# Patient Record
Sex: MALE | Race: WHITE | NOT HISPANIC OR LATINO | Employment: FULL TIME | URBAN - NONMETROPOLITAN AREA
[De-identification: names, ages, dates, MRNs, and addresses within clinical notes are randomized per-mention and may not be internally consistent; named-entity substitution may affect disease eponyms.]

---

## 2022-05-09 PROCEDURE — U0004 COV-19 TEST NON-CDC HGH THRU: HCPCS | Performed by: NURSE PRACTITIONER

## 2022-05-16 PROCEDURE — U0004 COV-19 TEST NON-CDC HGH THRU: HCPCS | Performed by: NURSE PRACTITIONER

## 2022-09-23 ENCOUNTER — TRANSCRIBE ORDERS (OUTPATIENT)
Dept: LAB | Facility: HOSPITAL | Age: 57
End: 2022-09-23

## 2022-09-23 ENCOUNTER — HOSPITAL ENCOUNTER (OUTPATIENT)
Dept: GENERAL RADIOLOGY | Facility: HOSPITAL | Age: 57
Discharge: HOME | End: 2022-09-23

## 2022-09-23 DIAGNOSIS — Z02.1 PRE-EMPLOYMENT HEALTH SCREENING EXAMINATION: ICD-10-CM

## 2022-09-23 DIAGNOSIS — Z02.1 PRE-EMPLOYMENT HEALTH SCREENING EXAMINATION: Primary | ICD-10-CM

## 2022-09-23 PROCEDURE — 71046 X-RAY EXAM CHEST 2 VIEWS: CPT | Performed by: RADIOLOGY

## 2022-09-23 PROCEDURE — 71046 X-RAY EXAM CHEST 2 VIEWS: CPT

## 2025-01-02 ENCOUNTER — APPOINTMENT (OUTPATIENT)
Dept: MRI IMAGING | Facility: HOSPITAL | Age: 60
End: 2025-01-02
Payer: MEDICAID

## 2025-01-02 ENCOUNTER — APPOINTMENT (OUTPATIENT)
Dept: GENERAL RADIOLOGY | Facility: HOSPITAL | Age: 60
End: 2025-01-02
Payer: MEDICAID

## 2025-01-02 ENCOUNTER — HOSPITAL ENCOUNTER (EMERGENCY)
Facility: HOSPITAL | Age: 60
Discharge: HOME | End: 2025-01-02
Attending: EMERGENCY MEDICINE
Payer: MEDICAID

## 2025-01-02 ENCOUNTER — APPOINTMENT (OUTPATIENT)
Dept: CT IMAGING | Facility: HOSPITAL | Age: 60
End: 2025-01-02
Payer: MEDICAID

## 2025-01-02 VITALS
HEART RATE: 81 BPM | SYSTOLIC BLOOD PRESSURE: 135 MMHG | TEMPERATURE: 97.5 F | HEIGHT: 71 IN | WEIGHT: 201 LBS | DIASTOLIC BLOOD PRESSURE: 96 MMHG | BODY MASS INDEX: 28.14 KG/M2 | RESPIRATION RATE: 18 BRPM | OXYGEN SATURATION: 98 %

## 2025-01-02 DIAGNOSIS — R20.2 PARESTHESIAS: Primary | ICD-10-CM

## 2025-01-02 LAB
ALBUMIN SERPL-MCNC: 4.3 G/DL (ref 3.5–5.2)
ALBUMIN/GLOB SERPL: 1.3 G/DL
ALP SERPL-CCNC: 106 U/L (ref 39–117)
ALT SERPL W P-5'-P-CCNC: 15 U/L (ref 1–41)
ANION GAP SERPL CALCULATED.3IONS-SCNC: 9.8 MMOL/L (ref 5–15)
APTT PPP: 30.1 SECONDS (ref 23–35)
AST SERPL-CCNC: 20 U/L (ref 1–40)
BASOPHILS # BLD AUTO: 0.05 10*3/MM3 (ref 0–0.2)
BASOPHILS NFR BLD AUTO: 0.5 % (ref 0–1.5)
BILIRUB SERPL-MCNC: 0.5 MG/DL (ref 0–1.2)
BUN SERPL-MCNC: 8 MG/DL (ref 6–20)
BUN/CREAT SERPL: 10.3 (ref 7–25)
CALCIUM SPEC-SCNC: 9.5 MG/DL (ref 8.6–10.5)
CHLORIDE SERPL-SCNC: 105 MMOL/L (ref 98–107)
CO2 SERPL-SCNC: 26.2 MMOL/L (ref 22–29)
CREAT SERPL-MCNC: 0.78 MG/DL (ref 0.76–1.27)
DEPRECATED RDW RBC AUTO: 41.7 FL (ref 37–54)
EGFRCR SERPLBLD CKD-EPI 2021: 104 ML/MIN/1.73
EOSINOPHIL # BLD AUTO: 0.17 10*3/MM3 (ref 0–0.4)
EOSINOPHIL NFR BLD AUTO: 1.6 % (ref 0.3–6.2)
ERYTHROCYTE [DISTWIDTH] IN BLOOD BY AUTOMATED COUNT: 13.7 % (ref 12.3–15.4)
GLOBULIN UR ELPH-MCNC: 3.3 GM/DL
GLUCOSE SERPL-MCNC: 128 MG/DL (ref 65–99)
HCT VFR BLD AUTO: 48.7 % (ref 37.5–51)
HGB BLD-MCNC: 15.5 G/DL (ref 13–17.7)
HOLD SPECIMEN: NORMAL
HOLD SPECIMEN: NORMAL
IMM GRANULOCYTES # BLD AUTO: 0.04 10*3/MM3 (ref 0–0.05)
IMM GRANULOCYTES NFR BLD AUTO: 0.4 % (ref 0–0.5)
INR PPP: 1 (ref 0.9–1.1)
LYMPHOCYTES # BLD AUTO: 2.49 10*3/MM3 (ref 0.7–3.1)
LYMPHOCYTES NFR BLD AUTO: 23.7 % (ref 19.6–45.3)
MCH RBC QN AUTO: 26.6 PG (ref 26.6–33)
MCHC RBC AUTO-ENTMCNC: 31.8 G/DL (ref 31.5–35.7)
MCV RBC AUTO: 83.7 FL (ref 79–97)
MONOCYTES # BLD AUTO: 0.72 10*3/MM3 (ref 0.1–0.9)
MONOCYTES NFR BLD AUTO: 6.9 % (ref 5–12)
NEUTROPHILS NFR BLD AUTO: 66.9 % (ref 42.7–76)
NEUTROPHILS NFR BLD AUTO: 7.03 10*3/MM3 (ref 1.7–7)
NRBC BLD AUTO-RTO: 0 /100 WBC (ref 0–0.2)
PLATELET # BLD AUTO: 236 10*3/MM3 (ref 140–450)
PMV BLD AUTO: 10.8 FL (ref 6–12)
POTASSIUM SERPL-SCNC: 4.1 MMOL/L (ref 3.5–5.2)
PROT SERPL-MCNC: 7.6 G/DL (ref 6–8.5)
PROTHROMBIN TIME: 13.7 SECONDS (ref 12.3–15.1)
RBC # BLD AUTO: 5.82 10*6/MM3 (ref 4.14–5.8)
SODIUM SERPL-SCNC: 141 MMOL/L (ref 136–145)
WBC NRBC COR # BLD AUTO: 10.5 10*3/MM3 (ref 3.4–10.8)
WHOLE BLOOD HOLD COAG: NORMAL
WHOLE BLOOD HOLD SPECIMEN: NORMAL

## 2025-01-02 PROCEDURE — 99204 OFFICE O/P NEW MOD 45 MIN: CPT | Performed by: INTERNAL MEDICINE

## 2025-01-02 PROCEDURE — 70498 CT ANGIOGRAPHY NECK: CPT

## 2025-01-02 PROCEDURE — 85025 COMPLETE CBC W/AUTO DIFF WBC: CPT | Performed by: EMERGENCY MEDICINE

## 2025-01-02 PROCEDURE — 36415 COLL VENOUS BLD VENIPUNCTURE: CPT

## 2025-01-02 PROCEDURE — 93005 ELECTROCARDIOGRAM TRACING: CPT | Performed by: EMERGENCY MEDICINE

## 2025-01-02 PROCEDURE — 99285 EMERGENCY DEPT VISIT HI MDM: CPT | Performed by: EMERGENCY MEDICINE

## 2025-01-02 PROCEDURE — 70496 CT ANGIOGRAPHY HEAD: CPT

## 2025-01-02 PROCEDURE — 80053 COMPREHEN METABOLIC PANEL: CPT | Performed by: EMERGENCY MEDICINE

## 2025-01-02 PROCEDURE — 25510000001 IOPAMIDOL 61 % SOLUTION: Performed by: EMERGENCY MEDICINE

## 2025-01-02 PROCEDURE — 85730 THROMBOPLASTIN TIME PARTIAL: CPT | Performed by: EMERGENCY MEDICINE

## 2025-01-02 PROCEDURE — 70551 MRI BRAIN STEM W/O DYE: CPT

## 2025-01-02 PROCEDURE — 85610 PROTHROMBIN TIME: CPT | Performed by: EMERGENCY MEDICINE

## 2025-01-02 PROCEDURE — 70450 CT HEAD/BRAIN W/O DYE: CPT

## 2025-01-02 PROCEDURE — 0042T HC CT CEREBRAL PERFUSION W/WO CONTRAST: CPT

## 2025-01-02 PROCEDURE — 71045 X-RAY EXAM CHEST 1 VIEW: CPT

## 2025-01-02 RX ORDER — SODIUM CHLORIDE 0.9 % (FLUSH) 0.9 %
10 SYRINGE (ML) INJECTION AS NEEDED
Status: DISCONTINUED | OUTPATIENT
Start: 2025-01-02 | End: 2025-01-02

## 2025-01-02 RX ORDER — SODIUM CHLORIDE 0.9 % (FLUSH) 0.9 %
10 SYRINGE (ML) INJECTION AS NEEDED
Status: DISCONTINUED | OUTPATIENT
Start: 2025-01-02 | End: 2025-01-02 | Stop reason: HOSPADM

## 2025-01-02 RX ORDER — IOPAMIDOL 612 MG/ML
100 INJECTION, SOLUTION INTRAVASCULAR
Status: COMPLETED | OUTPATIENT
Start: 2025-01-02 | End: 2025-01-02

## 2025-01-02 RX ORDER — IOPAMIDOL 612 MG/ML
50 INJECTION, SOLUTION INTRAVASCULAR
Status: COMPLETED | OUTPATIENT
Start: 2025-01-02 | End: 2025-01-02

## 2025-01-02 RX ADMIN — IOPAMIDOL 75 ML: 612 INJECTION, SOLUTION INTRAVENOUS at 14:06

## 2025-01-02 RX ADMIN — IOPAMIDOL 40 ML: 612 INJECTION, SOLUTION INTRAVENOUS at 13:58

## 2025-01-02 NOTE — ED PROVIDER NOTES
Subjective   History of Present Illness  59-year-old male who presents for evaluation of paresthesias in his left arm that has now extended to his left face.  Patient reports that roughly 20 minutes prior to arrival he was at the store and he began to have tingling in his left hand.  It then progressed into his left face.  He continues to persist at this given time.  He denies any focal weakness.  He does appear nervous and anxious.  He does report having a TIA 4 years ago and was evaluated in [place].  Does not take any medications including no anticoagulants at this time.  He denies fever or infectious symptoms.  He reports feeling well prior to the onset of the symptoms.  No chest pain or abdominal pain.  No nausea or vomiting.  No change in bowel or urinary function.  No other acute complaints.  No recent change in overall habits.      Review of Systems   Constitutional:  Negative for chills, fatigue and fever.   HENT:  Negative for congestion, ear pain, postnasal drip, sinus pressure and sore throat.    Eyes:  Negative for pain, redness and visual disturbance.   Respiratory:  Negative for cough, chest tightness and shortness of breath.    Cardiovascular:  Negative for chest pain, palpitations and leg swelling.   Gastrointestinal:  Negative for abdominal pain, anal bleeding, blood in stool, diarrhea, nausea and vomiting.   Endocrine: Negative for polydipsia and polyuria.   Genitourinary:  Negative for difficulty urinating, dysuria, frequency and urgency.   Musculoskeletal:  Negative for arthralgias, back pain and neck pain.   Skin:  Negative for pallor and rash.   Allergic/Immunologic: Negative for environmental allergies and immunocompromised state.   Neurological:  Positive for numbness. Negative for dizziness, weakness and headaches.   Hematological:  Negative for adenopathy.   Psychiatric/Behavioral:  Negative for confusion, self-injury and suicidal ideas. The patient is not nervous/anxious.    All other  systems reviewed and are negative.      Past Medical History:   Diagnosis Date    Stroke     TIA (transient ischemic attack)        No Known Allergies    History reviewed. No pertinent surgical history.    History reviewed. No pertinent family history.    Social History     Socioeconomic History    Marital status:    Tobacco Use    Smoking status: Never    Smokeless tobacco: Never   Vaping Use    Vaping status: Never Used   Substance and Sexual Activity    Alcohol use: Never    Drug use: Defer    Sexual activity: Defer           Objective   Physical Exam  Vitals and nursing note reviewed.   Constitutional:       General: He is not in acute distress.     Appearance: Normal appearance. He is well-developed. He is not toxic-appearing or diaphoretic.   HENT:      Head: Normocephalic and atraumatic.      Right Ear: External ear normal.      Left Ear: External ear normal.      Nose: Nose normal.   Eyes:      General: Lids are normal.      Pupils: Pupils are equal, round, and reactive to light.   Neck:      Trachea: No tracheal deviation.   Cardiovascular:      Rate and Rhythm: Normal rate and regular rhythm.      Pulses: No decreased pulses.      Heart sounds: Normal heart sounds. No murmur heard.     No friction rub. No gallop.   Pulmonary:      Effort: Pulmonary effort is normal. No respiratory distress.      Breath sounds: Normal breath sounds. No decreased breath sounds, wheezing, rhonchi or rales.   Abdominal:      General: Bowel sounds are normal.      Palpations: Abdomen is soft.      Tenderness: There is no abdominal tenderness. There is no guarding or rebound.   Musculoskeletal:         General: No deformity. Normal range of motion.      Cervical back: Normal range of motion and neck supple.   Lymphadenopathy:      Cervical: No cervical adenopathy.   Skin:     General: Skin is warm and dry.      Findings: No rash.   Neurological:      Mental Status: He is alert and oriented to person, place, and time.       GCS: GCS eye subscore is 4. GCS verbal subscore is 5. GCS motor subscore is 6.      Cranial Nerves: No cranial nerve deficit.      Sensory: No sensory deficit.      Comments: Paresthesias reported in the left face and left arm.    Subtle left lower facial droop and left arm drift.  Left lower facial droop may be chronic and baseline as the nose is slightly deviated to the left.   Psychiatric:         Mood and Affect: Mood is anxious.         Speech: Speech normal.         Behavior: Behavior normal.         Thought Content: Thought content normal.         Judgment: Judgment normal.         Procedures           ED Course  ED Course as of 01/05/25 1013   Thu Jan 02, 2025   1415 EKG performed on 1/2/2025 at 1401 was independently inter by myself shows sinus rhythm, occasional PVCs, normal QRS, normal QTc, no acute ischemic changes. [NS]      ED Course User Index  [NS] Apolinar Ha MD                                                       Medical Decision Making  Differential diagnosis includes TIA, stroke, cervical radiculopathy, hyperventilation, anxiety attack, other unspecified etiology.    Labs are normal including normal kidney function electrolytes.  Normal white count.    Due to relatively sudden symptom onset the patient was ran as a code stroke and the stroke service was consulted.  It was recommended against tenecteplase or any kind of acute intervention.    CT scan of the head without contrast, CT perfusion, CT angio of the head and neck, MRI of the brain, and chest x-ray showed no acute abnormalities.    CT angiogram the neck does report mild plaque causing less than 50% stenosis of bilateral internal carotid arteries.  Asymmetric enlargement of the cord plexus in the right occipital horn on CT scan of the head papilloma is not secluded.    No significant intracranial arterial stenosis.    The patient's vital signs and overall appearance has remained normal throughout the ER course.    He will be  discharged with the advice to monitor symptoms and follow-up outpatient with neurology.    Problems Addressed:  Paresthesias: complicated acute illness or injury with systemic symptoms    Amount and/or Complexity of Data Reviewed  External Data Reviewed: labs, radiology and ECG.  Labs: ordered. Decision-making details documented in ED Course.  Radiology: ordered and independent interpretation performed. Decision-making details documented in ED Course.  ECG/medicine tests: ordered and independent interpretation performed. Decision-making details documented in ED Course.    Risk  Prescription drug management.        Final diagnoses:   Paresthesias       ED Disposition  ED Disposition       ED Disposition   Discharge    Condition   Stable    Comment   --               [facility] NEUROLOGY PROVIDER  [address] [place] [id number] [phone]  Schedule an appointment as soon as possible for a visit       PATIENT CONNECTION - [facility] [id number]  [phone]  Schedule an appointment as soon as possible for a visit            Medication List      No changes were made to your prescriptions during this visit.            Apolinar Ha MD  01/05/25 1016

## 2025-01-02 NOTE — CONSULTS
[facility]   Teleneurology Note    Patient Name: Kevin Thurman  : 1965  MRN: [id number]  Primary Care Physician: Provider, No Known  Referring Site: [place]  Location of Neurologist: [place]    Subjective   Teleneurology Initial Data           Neurologist Evaluation Date: 25 Neurologist Evaluation Time: 8   Date Last Known Well: 25       History     59 year old man with hx of migraines, varicose veins, DM , an episode of R sided numbness - TIA a few years back. Not compliant with secondary prevention. Coming with left sided numbness.     Stroke Risk Factors/ Pertinent Data     Stroke risk factors: prior stroke/ TIA  Anticoagulants prior to arrival: none  Antiplatelets prior to arrival: none  Statins prior to arrival: none     Scoring Scales     Modified [place] Scale  Modified [place] Scale: 0 - No Symptoms at all.    NIH Stroke Scale           Interval: baseline  1a. Level of Consciousness: 0-->Alert, keenly responsive  1b. LOC Questions: 0-->Answers both questions correctly  1c. LOC Commands: 0-->Performs both tasks correctly  2. Best Gaze: 0-->Normal  3. Visual: 0-->No visual loss  4. Facial Palsy: 0-->Normal symmetrical movements  5a. Motor Arm, Left: 0-->No drift, limb holds 90 (or 45) degrees for full 10 secs  5b. Motor Arm, Right: 0-->No drift, limb holds 90 (or 45) degrees for full 10 secs  6a. Motor Leg, Left: 0-->No drift, leg holds 30 degree position for full 5 secs  6b. Motor Leg, Right: 0-->No drift, leg holds 30 degree position for full 5 secs  7. Limb Ataxia: 0-->Absent  8. Sensory: 0-->Normal, no sensory loss  9. Best Language: 0-->No aphasia, normal  10. Dysarthria: 0-->Normal  11. Extinction and Inattention (formerly Neglect): 0-->No abnormality  Total (NIH Stroke Scale): 0     Review of Systems     Review of Systems    10+ systems were reviewed.  In addition to what is listed in the HPI, the following was positive:     Constitutional: No fevers or chills.  Psychiatric: No  depression/anxiety.  Skin: No rashes.  Respiratory: No shortness of breath.  Cardiovascular: No chest pain.  GI: No nausea/vomiting.  : No incontinence.  Endocrine: No polydipsia.  Musculoskeletal: No muscle pain/joint pain.  Neurologic: as per HPI and otherwise negative  Hematologic: No excessive bruising.      Objective   Exam     Exam performed with the help of support staff from the referring site  Neurological Exam      General: Alert, cooperative, no distress, appears stated age  Head: normocephalic, without obvious abnormalities, atraumatic  Eyes: conjunctivae/corneas clear  Throat: lips, mucosa, and tongue normal  Lungs: non labored breathing  Extremities: No edema, no cyanosis, no deformities  psych: judgement and insight is appropriate, see neuro exam for mental status.      Neurological Examination:    Mental status: fully alert; fully oriented; normal language fluency, comprehension. No dysarthria was appreciated; No evidence of visuospatial or tactile neglect;  Cranial nerves:  visual fields were full to confrontation; pupils were equal and reactive to light; versions were full without nystagmus; facial sensation was full; eye closure symmetric and smile was symmetric; handles own secretions, shoulder shrug was symmetric; tongue protruded midline.  Motor:  no pronator drift; power was grossly full throughout  Sensory: R sided numbness  Coordination:  no ataxia with finger to nose or heel to shin testing. No involuntary movements were observed.      Result Review    Results     CT head no acute IC changes  CTA head and neck was okay    Thrombolytic   Thrombolytics: thrombolytic not given  Thrombolytic Relative Exclusions for All Patients: Only minor non-disabling symptoms     Assessment & Plan   Assessment/ Plan     Assessment:    TIA vs complex migraine.    Plan:    Permissive HTN, treat if SBP is < 220 and if DBP > 110.  MRI brain wo contrast  ECHO, A1C  ASA 81 mg and clopidogrel 300 mg load and 75  mg maintenance.   Atorvastatin 80 mg  TSH, vit b12, folate, CBC, CMP  PT/OT  Follow up in stroke clinic.    Disposition         I, Lit Valle MD, saw the patient on 01/02/25 at 1418 for an initial in-patient or emergency room telememedicine face to face consult using interactive technology for stroke code. The location of the patient was [place]. I was located at [place].      Lit Valle MD

## 2025-01-02 NOTE — Clinical Note
[facility] EMERGENCY DEPARTMENT  [address] [address]  Phone: [phone]    Kevin Thurman was seen and treated in our emergency department on 1/2/2025.  He may return to work on 01/06/2025.         Thank you for choosing [facility].    Apolinar Ha MD

## 2025-01-22 ENCOUNTER — OFFICE VISIT (OUTPATIENT)
Dept: FAMILY MEDICINE CLINIC | Facility: CLINIC | Age: 60
End: 2025-01-22
Payer: COMMERCIAL

## 2025-01-22 VITALS
DIASTOLIC BLOOD PRESSURE: 84 MMHG | HEIGHT: 71 IN | SYSTOLIC BLOOD PRESSURE: 130 MMHG | RESPIRATION RATE: 16 BRPM | BODY MASS INDEX: 28.61 KG/M2 | WEIGHT: 204.4 LBS | OXYGEN SATURATION: 97 % | HEART RATE: 71 BPM

## 2025-01-22 DIAGNOSIS — I86.1 LEFT VARICOCELE: ICD-10-CM

## 2025-01-22 DIAGNOSIS — Z12.11 SCREENING FOR MALIGNANT NEOPLASM OF COLON: ICD-10-CM

## 2025-01-22 DIAGNOSIS — R07.9 CHEST PAIN, UNSPECIFIED TYPE: ICD-10-CM

## 2025-01-22 DIAGNOSIS — Z00.00 ANNUAL PHYSICAL EXAM: ICD-10-CM

## 2025-01-22 DIAGNOSIS — Z76.89 ENCOUNTER TO ESTABLISH CARE WITH NEW DOCTOR: Primary | ICD-10-CM

## 2025-01-22 DIAGNOSIS — R29.90 STROKE-LIKE SYMPTOMS: ICD-10-CM

## 2025-01-22 DIAGNOSIS — G43.009 MIGRAINE WITHOUT AURA AND WITHOUT STATUS MIGRAINOSUS, NOT INTRACTABLE: ICD-10-CM

## 2025-01-22 DIAGNOSIS — E11.9 TYPE 2 DIABETES MELLITUS WITHOUT COMPLICATION, WITHOUT LONG-TERM CURRENT USE OF INSULIN: ICD-10-CM

## 2025-01-22 LAB
EXPIRATION DATE: ABNORMAL
HBA1C MFR BLD: 6.9 % (ref 4.5–5.7)
Lab: ABNORMAL

## 2025-01-22 PROCEDURE — 83036 HEMOGLOBIN GLYCOSYLATED A1C: CPT | Performed by: STUDENT IN AN ORGANIZED HEALTH CARE EDUCATION/TRAINING PROGRAM

## 2025-01-22 PROCEDURE — 99204 OFFICE O/P NEW MOD 45 MIN: CPT | Performed by: STUDENT IN AN ORGANIZED HEALTH CARE EDUCATION/TRAINING PROGRAM

## 2025-01-22 RX ORDER — ASPIRIN 81 MG/1
81 TABLET ORAL DAILY
Qty: 90 TABLET | Refills: 0 | Status: SHIPPED | OUTPATIENT
Start: 2025-01-22

## 2025-01-22 NOTE — PROGRESS NOTES
New Patient Office Visit      Date: 2025  Patient Name: Kevin Thurman  : 1965   MRN: [id number]     Chief Complaint:    Chief Complaint   Patient presents with    Migraine     No history of, started 2024    Establish Care    Varicocele     Bulge on testicles, has had for about 4 years       History of Present Illness: Kevin Thurman is a 59 y.o. male who is here today to establish with family medicine.    Patient reports that he is here to establish with new family physician.    Patient reports that he was seen in hospital recently for stroke like symptoms though was confirmed to not be stroke and has been referred to neurology. Patient reports that symptoms were left arm numbness, left face numbness, and starting to be numb in left leg. Patient reports that these symptoms are similar to previous episode he had 4 years ago though did have some chest pain during this occurrence. Patient reports that symptoms have at present resolved.     Patient reports that he has developed migraines starting in 2024. Patient describes this as being triggered by bright light followed by sharp pain behind both eyes. Patient describes pain was present most significantly on head near temples and behind eyes. Patient does not recall if eyes were watering. Patient denies history of migraines prior to this. Patient reports that he was off work at the time and does not recall factors that were different from normal that would cause this. Patient reports that he has had 2 migraines in total and is very sensitive to light during these times. Patient reports that he has used Tylenol for this and this has helped.    Patient reports that he was diagnosed with Type II Diabetes at the same time as previous stroke like symptoms 4 years ago. Patient reports that he was on metformin previously though discontinued this because it caused GI symptoms and has since controlled this with diet and exercise.    Patient reports  "that he does have a varicocele on his left testicle for roughly 4 years. Patient reports that he has seen a urologist in [place] prior to moving here. Patient reports that they had considered surgery roughly 4 years ago though has not re-established yet to have this done. Patient would like to establish with urologist.      Subjective     Past Medical History:   Past Medical History:   Diagnosis Date    Stroke     TIA (transient ischemic attack)        Past Surgical History: History reviewed. No pertinent surgical history.    Family History: History reviewed. No pertinent family history.    Social History:   Social History     Socioeconomic History    Marital status:    Tobacco Use    Smoking status: Never    Smokeless tobacco: Never   Vaping Use    Vaping status: Never Used   Substance and Sexual Activity    Alcohol use: Never    Drug use: Defer    Sexual activity: Defer       Medications:     Current Outpatient Medications:     aspirin 81 MG EC tablet, Take 1 tablet by mouth Daily., Disp: 90 tablet, Rfl: 0    Allergies:   No Known Allergies    Objective     Physical Exam:  Vital Signs:   Vitals:    01/22/25 0918   BP: 130/84   Pulse: 71   Resp: 16   SpO2: 97%   Weight: 92.7 kg (204 lb 6.4 oz)   Height: 180.3 cm (71\")     Body mass index is 28.51 kg/m².   Facility age limit for growth %michelle is 20 years.       Physical Exam    General:  Well appearing adult male in no acute distress. Alert and oriented. Vitals reviewed.  Head/ENT: Atraumatic. No facial/sinus tenderness to palpation. Tympanic membranes are normal bilaterally with normal appearing auditory canals. Oral cavity unremarkable.  Neck: Anatomy appears symmetrical. There is no palpable lymphadenopathy to palpation.  Cardiac: Regular rate and rhythm to auscultation. I detect no obvious murmurs or additional heart sounds during exam.  Pulmonary: Lungs are clear to auscultation bilaterally.  Abdomen: Normal appearing abdomen. Normal bowel sounds to " auscultation. Non-tender to palpation of upper and lower abdominal quadrants bilaterally.  Pelvic/Genitourinary: Penile examination is unremarkable without evidence of lesions or abnormal discharge. Testicular examination shows findings consistent with left varicocele to palpation.  Extremities: Upper extremities demonstrate preserved range of motion. There appears to be no evidence of edema bilaterally. Lower extremities demonstrate preserved range of motion - both passive and active. There is no appreciated lower extremity edema bilaterally to palpation.  Integumentary/Skin: Skin appears unremarkable from observable skin surfaces.   Neurological: Cranial nerves appear grossly intact. Cerebellar function appears preserved. Motor function preserved in bilateral upper and lower extremities. Sensation preserved in face/head and bilateral upper and lower extremities. Normal gait and speech.  Behavioral/Psych: Patient behavior/demeanor appears consistent with reported age. Patient is pleasant with normal affect today.      Procedures      Assessment / Plan      1. Encounter to establish care with new doctor    2. Stroke-like symptoms    3. Chest pain, unspecified type  - Adult Transthoracic Echo Complete W/ Cont if Necessary Per Protocol; Future  - aspirin 81 MG EC tablet; Take 1 tablet by mouth Daily.  Dispense: 90 tablet; Refill: 0  - Ambulatory Referral to Cardiology    4. Migraine without aura and without status migrainosus, not intractable    5. Type 2 diabetes mellitus without complication, without long-term current use of insulin  - POC Glycosylated Hemoglobin (Hb A1C)  - Comprehensive metabolic panel  - Hemoglobin A1c  - Lipid panel    6. Left varicocele  - Ambulatory Referral to Urology    7. Annual physical exam  - Ambulatory Referral For Screening Colonoscopy  - CBC w AUTO Differential  - Comprehensive metabolic panel  - Hemoglobin A1c  - TSH Rfx On Abnormal To Free T4  - Lipid panel  - Hepatitis C  antibody    8. Screening for malignant neoplasm of colon  - Ambulatory Referral For Screening Colonoscopy     Assessment & Plan  1. Stroke-like symptoms.  He was recently evaluated in the emergency setting for stroke-like symptoms, including left arm numbness, left facial numbness, and the onset of numbness in his left leg. His symptoms have since resolved, and he has an upcoming appointment with neurology, although he has not yet been seen by them. A review of his imaging shows no acute stroke, and he has been encouraged to keep his neurology appointment.    2. Chest pain.  He experienced chest pain during the episode of left arm and facial numbness. An EKG performed in the emergency department showed no evidence of ischemia, but it appears that no troponin test was conducted. Given his diabetes, he is at an increased risk for cardiovascular disease. An order for an echocardiogram will be placed today. A referral to cardiology for evaluation of a potential stress test and, depending on the results, a potential heart catheterization has also been placed. The initiation of low-dose aspirin has been discussed, and he is agreeable to this. The potential need of statin therapy, given his diabetes, have also been discussed, and he is agreeable to this pending labs. A lipid profile will be obtained today. Patient to seek care in emergency setting for chest pain or shortness of breath.    3. Migraines.  He reports the onset of migraines beginning in 12/2024, describing 2 episodes of headaches with pain primarily in the temple areas and behind the eyes. These headaches are accompanied by light sensitivity and have responded well to Tylenol. It was discussed that medications such as NSAIDs or combination medicines like Excedrin Migraine would likely provide better relief. If he continues to experience frequent headaches that are not alleviated by Excedrin Migraine, prophylactic migraine medication and abortive medication may  need to be considered. He understands this plan.    4. Type 2 diabetes.  He has a diagnosis of type 2 diabetes, made approximately 4 years ago when he experienced similar stroke-like symptoms. His A1c was above 11, and he was subsequently placed on metformin. However, he discontinued metformin due to gastrointestinal side effects and has been managing his diabetes with diet and exercise. His point-of-care A1c today is 6.9. It was discussed that ideally, he would be on medication, and the possibility of starting an SGLT2 inhibitor such as Jardiance or Farxiga was discussed. He wishes to consider this option and prefers to control his diabetes with diet and exercise in the meantime. He will contemplate medication between now and his next visit. The potential need for statin medication has also been discussed. A diabetic foot exam is planned for next month. He is establishing care with an eye physician.    5. Varicocele.  He has a reported varicocele, and the physical exam findings are consistent with a left-sided varicocele. He has previously seen a urologist prior to moving to [place], and it appears that surgery was planned as the varicocele is somewhat tender. Outside urological records will be obtained, and a urologic referral will be placed today for reestablishment.    6. Health maintenance.  He declines the influenza vaccination today. He is agreeable to screening/surveillance labs, including CBC, CMP, TSH, lipid panel, A1c, and hepatitis C screening. He is also agreeable to colorectal screening with colonoscopy. He reports that he is a previous smoker but has not smoked in 20 years.    Follow-up  The patient will follow up in 1 month.      Patient or patient representative verbalized consent for the use of Ambient Listening during the visit with  Tong Sorenson MD for chart documentation. 1/22/2025  11:09 EST       Follow Up:   Return in about 1 month (around 2/22/2025).      Tong Sorenson  MD [facility] FLORES [facility]  [facility] FAMILY MEDICINE  [address] [place] [address] [address] [place] [address]  Fax [phone]  Phone [phone]

## 2025-01-23 ENCOUNTER — TELEPHONE (OUTPATIENT)
Dept: SURGERY | Facility: CLINIC | Age: 60
End: 2025-01-23
Payer: COMMERCIAL

## 2025-01-23 ENCOUNTER — PATIENT ROUNDING (BHMG ONLY) (OUTPATIENT)
Dept: FAMILY MEDICINE CLINIC | Facility: CLINIC | Age: 60
End: 2025-01-23
Payer: COMMERCIAL

## 2025-01-23 RX ORDER — POLYETHYLENE GLYCOL 3350 17 G/17G
POWDER, FOR SOLUTION ORAL
Qty: 238 G | Refills: 0 | Status: SHIPPED | OUTPATIENT
Start: 2025-01-23

## 2025-01-23 RX ORDER — BISACODYL 5 MG/1
TABLET, DELAYED RELEASE ORAL
Qty: 4 TABLET | Refills: 0 | Status: SHIPPED | OUTPATIENT
Start: 2025-01-23

## 2025-01-23 NOTE — TELEPHONE ENCOUNTER
Pt would like to scheduled at [facility] on 03/21 W/ Dr. Saeed-verified pharmacy/insurance. Thank you.

## 2025-01-23 NOTE — TELEPHONE ENCOUNTER
PRESCREENING FOR OPEN ACCESS SCHEDULING    Kevin Thurman, 1965  [id number]    01/23/25    If, the patient answers yes to any of the following questions the provider will be informed prior to scheduling open access for approval and documented in the chart.    []  Yes  [x] No    1. Have you ever had a colonoscopy in the past?      When:        Where:       Polyps or other:     []  Yes  [x] No    2. Family history of colon cancer?      Relation:       Age of onset:       Do you currently have any of the following?    []  Yes  [x] No  Rectal bleeding, if so, how long?     []  Yes  [x] No  Abdominal pain, if so, how long?    []  Yes  [x] No  Constipation, if so, how long?    []  Yes  [x] No  Diarrhea, if so, how long?    []  Yes  [x] No  Weight loss, is so, how much?    [] Yes  [x] No  Small caliber stool, if so, how long?    []Yes  [x] No  Do you have Hemorroids?    []Yes  [x] No  Have you been diagnosed with Anemia?    []Yes  [x] No  Do you have difficulty swallowing?  []Yes  [x] No  Do you have a history of esophageal stricture or dilation?(If yes do not schedule with Dr Griffith)    []Yes  [x] No  Do you have acid reflux?    Have you ever had any of the following conditions?    [] Yes  [x] No  Heart attack?      When?       Last cardiac workup?     Current Cardiologist?     Blood thinners? 81 Mg Aspirin     [] Yes  [x] No   Lung problems, asthma or COPD?  [] Yes  [x] No  Oxygen required?       [] Yes  [x] No  Stroke?     [] Yes  [x] No  Have you ever had a reaction to anesthesia?

## 2025-01-23 NOTE — TELEPHONE ENCOUNTER
Miralax prep sent to pharmacy.  Chart reviewed and ok to proceed as open access.  No past colon or family history

## 2025-01-24 ENCOUNTER — PREP FOR SURGERY (OUTPATIENT)
Dept: OTHER | Facility: HOSPITAL | Age: 60
End: 2025-01-24
Payer: COMMERCIAL

## 2025-01-24 DIAGNOSIS — I86.1 VARICOCELE: Primary | ICD-10-CM

## 2025-01-24 DIAGNOSIS — Z12.11 SCREENING FOR COLON CANCER: Primary | ICD-10-CM

## 2025-01-24 LAB
ALBUMIN SERPL-MCNC: 4 G/DL (ref 3.5–5.2)
ALBUMIN/GLOB SERPL: 1.5 G/DL
ALP SERPL-CCNC: 104 U/L (ref 39–117)
ALT SERPL-CCNC: 18 U/L (ref 1–41)
AST SERPL-CCNC: 14 U/L (ref 1–40)
BASOPHILS # BLD AUTO: 0.04 10*3/MM3 (ref 0–0.2)
BASOPHILS NFR BLD AUTO: 0.6 % (ref 0–1.5)
BILIRUB SERPL-MCNC: 0.5 MG/DL (ref 0–1.2)
BUN SERPL-MCNC: 9 MG/DL (ref 6–20)
BUN/CREAT SERPL: 11.7 (ref 7–25)
CALCIUM SERPL-MCNC: 9.7 MG/DL (ref 8.6–10.5)
CHLORIDE SERPL-SCNC: 105 MMOL/L (ref 98–107)
CHOLEST SERPL-MCNC: 154 MG/DL (ref 0–200)
CO2 SERPL-SCNC: 27.7 MMOL/L (ref 22–29)
CREAT SERPL-MCNC: 0.77 MG/DL (ref 0.76–1.27)
EGFRCR SERPLBLD CKD-EPI 2021: 104.4 ML/MIN/1.73
EOSINOPHIL # BLD AUTO: 0.22 10*3/MM3 (ref 0–0.4)
EOSINOPHIL NFR BLD AUTO: 3.5 % (ref 0.3–6.2)
ERYTHROCYTE [DISTWIDTH] IN BLOOD BY AUTOMATED COUNT: 12.4 % (ref 12.3–15.4)
GLOBULIN SER CALC-MCNC: 2.6 GM/DL
GLUCOSE SERPL-MCNC: 140 MG/DL (ref 65–99)
HBA1C MFR BLD: 6.9 % (ref 4.8–5.6)
HCT VFR BLD AUTO: 46.6 % (ref 37.5–51)
HCV IGG SERPL QL IA: NON REACTIVE
HDLC SERPL-MCNC: 47 MG/DL (ref 40–60)
HGB BLD-MCNC: 15 G/DL (ref 13–17.7)
IMM GRANULOCYTES # BLD AUTO: 0.01 10*3/MM3 (ref 0–0.05)
IMM GRANULOCYTES NFR BLD AUTO: 0.2 % (ref 0–0.5)
LDLC SERPL CALC-MCNC: 94 MG/DL (ref 0–100)
LYMPHOCYTES # BLD AUTO: 2.17 10*3/MM3 (ref 0.7–3.1)
LYMPHOCYTES NFR BLD AUTO: 34.8 % (ref 19.6–45.3)
MCH RBC QN AUTO: 26.6 PG (ref 26.6–33)
MCHC RBC AUTO-ENTMCNC: 32.2 G/DL (ref 31.5–35.7)
MCV RBC AUTO: 82.6 FL (ref 79–97)
MONOCYTES # BLD AUTO: 0.56 10*3/MM3 (ref 0.1–0.9)
MONOCYTES NFR BLD AUTO: 9 % (ref 5–12)
NEUTROPHILS # BLD AUTO: 3.24 10*3/MM3 (ref 1.7–7)
NEUTROPHILS NFR BLD AUTO: 51.9 % (ref 42.7–76)
NRBC BLD AUTO-RTO: 0 /100 WBC (ref 0–0.2)
PLATELET # BLD AUTO: 239 10*3/MM3 (ref 140–450)
POTASSIUM SERPL-SCNC: 4.5 MMOL/L (ref 3.5–5.2)
PROT SERPL-MCNC: 6.6 G/DL (ref 6–8.5)
RBC # BLD AUTO: 5.64 10*6/MM3 (ref 4.14–5.8)
SODIUM SERPL-SCNC: 139 MMOL/L (ref 136–145)
T4 FREE SERPL-MCNC: 2.37 NG/DL (ref 0.93–1.7)
TRIGL SERPL-MCNC: 65 MG/DL (ref 0–150)
TSH SERPL DL<=0.005 MIU/L-ACNC: <0.005 UIU/ML (ref 0.27–4.2)
VLDLC SERPL CALC-MCNC: 13 MG/DL (ref 5–40)
WBC # BLD AUTO: 6.24 10*3/MM3 (ref 3.4–10.8)

## 2025-01-28 ENCOUNTER — TELEPHONE (OUTPATIENT)
Dept: FAMILY MEDICINE CLINIC | Facility: CLINIC | Age: 60
End: 2025-01-28
Payer: COMMERCIAL

## 2025-01-28 NOTE — TELEPHONE ENCOUNTER
----- Message from Tong Sorenson sent at 1/28/2025  9:30 AM EST -----  Please call patient.    His labs are suggestive of an overactive thyroid. I would like to obtain additional labs, thyroid US, and place referral to endocrinology if patient is agreeable to further assess this.    His A1c is the same as it was in office at 6.9. We can talk about this again at his follow up with me.    His other labs are normal.

## 2025-01-28 NOTE — TELEPHONE ENCOUNTER
Patient notified of results, he is agreeable to the additional labs, US, and Endo referral. He did ask if we could get the thyroid US scheduled at the same time as his US Scrotum. Advised patient to get labs drawn at this convenience and he will be contacted for the appts

## 2025-01-29 DIAGNOSIS — R79.89 ABNORMAL THYROID BLOOD TEST: Primary | ICD-10-CM

## 2025-01-31 ENCOUNTER — HOSPITAL ENCOUNTER (OUTPATIENT)
Dept: CARDIOLOGY | Facility: HOSPITAL | Age: 60
Discharge: HOME | End: 2025-01-31
Admitting: STUDENT IN AN ORGANIZED HEALTH CARE EDUCATION/TRAINING PROGRAM
Payer: COMMERCIAL

## 2025-01-31 VITALS
HEIGHT: 71 IN | DIASTOLIC BLOOD PRESSURE: 75 MMHG | SYSTOLIC BLOOD PRESSURE: 129 MMHG | WEIGHT: 204.37 LBS | BODY MASS INDEX: 28.61 KG/M2

## 2025-01-31 DIAGNOSIS — R07.9 CHEST PAIN, UNSPECIFIED TYPE: ICD-10-CM

## 2025-01-31 PROCEDURE — 93306 TTE W/DOPPLER COMPLETE: CPT

## 2025-02-01 LAB — LV EF BIPLANE MOD: 58 %

## 2025-02-05 ENCOUNTER — HOSPITAL ENCOUNTER (OUTPATIENT)
Facility: HOSPITAL | Age: 60
Discharge: HOME | End: 2025-02-05
Admitting: STUDENT IN AN ORGANIZED HEALTH CARE EDUCATION/TRAINING PROGRAM
Payer: COMMERCIAL

## 2025-02-05 DIAGNOSIS — I86.1 VARICOCELE: ICD-10-CM

## 2025-02-05 PROBLEM — E11.9 TYPE 2 DIABETES MELLITUS WITHOUT COMPLICATION: Status: ACTIVE | Noted: 2025-02-05

## 2025-02-05 PROBLEM — R07.89 CHEST PAIN, ATYPICAL: Status: ACTIVE | Noted: 2025-02-05

## 2025-02-05 PROCEDURE — 76870 US EXAM SCROTUM: CPT

## 2025-02-05 NOTE — ASSESSMENT & PLAN NOTE
Typical and atypical features with noted risk factors and family history  -Echocardiogram already obtained noting normal EF and grade I impaired relaxation  -Will obtain treadmill stress test to rule out ischemia  -Go to the ER with any new, changing, recurring, or worsening symptoms  -Follow up in 4 weeks to discuss test results with MD  Orders:    Treadmill Stress Test; Future

## 2025-02-05 NOTE — PROGRESS NOTES
[facility]    Office Consult     Kevin Thurman  [id number]  2025    Referred By: Tong Sorenson*    Chief Complaint   Patient presents with    Advice Only    Chest Pain       Subjective     History of Present Illness:   Kevin Thurman is a 59 y.o. male who presents to the Cardiology Clinic for evaluation of chest pain referred by PCP for further investigation.  He recently established care with PCP after an emergency department visit which he noted stroke-like symptoms, including left arm numbness, left facial numbness, and the onset of numbness in his left leg. His symptoms resolved and acute CVA was ruled out.  He did have noted chest pain with this episode.  Past health history of migraines and uncontrolled type 2 diabetes previously maintained on Metformin.  Stopped taking Metformin with GI side effects and has been managing with diet and exercise.  His A1c in the office was 6.9%.  His PCP started ASA therapy and placed referral for cardiology.      Since being discharged from the ER he has not had any further episodes of chest pain.  Describes left sided chest pressure radiated between shoulders.  No associated symptoms.  Episode lasted around 1 hr.  History of remote smoking.  Describes similar incident in the past about 4 years.  States he was driving and at the bank when the episode occurred.  Denies any increased stress.  Family history of heart disease.  Does not check blood pressure at home.  No cuurent complaints, denies any lower extremity edema or palpitations.      Past Cardiac Testin. Last Coronary Angio: None  2. Prior Stress Testing: None  3. Last Echo: 2025--EF-55%, Left ventricular diastolic function is consistent with (grade I) impaired relaxation   4. Prior Holter Monitor: None    Review of Systems   Constitutional:  Negative for activity change and fatigue.   Respiratory:  Negative for chest tightness and shortness of breath.   "  Cardiovascular:  Negative for chest pain, palpitations and leg swelling.   Neurological:  Negative for dizziness.   All other systems reviewed and are negative.       Past Medical History:   Diagnosis Date    Diabetes mellitus     Stroke     TIA (transient ischemic attack)        Past Surgical History:   Procedure Laterality Date    HERNIA REPAIR      TYMPANOPLASTY         Family History   Problem Relation Age of Onset    Heart disease Father         Sudhir adding names       Social History     Socioeconomic History    Marital status:    Tobacco Use    Smoking status: Former     Current packs/day: 0.00     Average packs/day: 0.5 packs/day for 3.0 years (1.5 ttl pk-yrs)     Types: Cigarettes     Start date: 1985     Quit date: 1988     Years since quittin.1    Smokeless tobacco: Never   Vaping Use    Vaping status: Never Used   Substance and Sexual Activity    Alcohol use: Never    Drug use: Never    Sexual activity: Yes     Partners: Female     Birth control/protection: Abstinence         Current Outpatient Medications:     aspirin 81 MG EC tablet, Take 1 tablet by mouth Daily., Disp: 90 tablet, Rfl: 0    bisacodyl (Dulcolax) 5 MG EC tablet, 4 TABLETS TO BE TAKING AT TIME DIRECTED ON INSTRUCTED THE DAY PRIOR TO COLONOSCOPY. (Patient not taking: Reported on 2025), Disp: 4 tablet, Rfl: 0    polyethylene glycol (MiraLax) 17 GM/SCOOP powder,  GRAM POWDER WITH 64 OZ OF CLEAR LIQUID. USE AS DIRECTED (Patient not taking: Reported on 2025), Disp: 238 g, Rfl: 0    No Known Allergies    Objective     Vitals:    25 0850   BP: 142/88   BP Location: Left arm   Patient Position: Sitting   Cuff Size: Adult   Pulse: 76   SpO2: 95%   Weight: 90.7 kg (200 lb)   Height: 177.8 cm (70\")     Body mass index is 28.7 kg/m².    Physical Exam  Vitals and nursing note reviewed.   Constitutional:       General: He is not in acute distress.     Appearance: Normal appearance. He is not ill-appearing or " toxic-appearing.   HENT:      Head: Normocephalic.      Mouth/Throat:      Mouth: Mucous membranes are moist.   Eyes:      Pupils: Pupils are equal, round, and reactive to light.   Cardiovascular:      Rate and Rhythm: Normal rate and regular rhythm.      Pulses: Normal pulses.      Heart sounds: Normal heart sounds. No murmur heard.  Pulmonary:      Effort: Pulmonary effort is normal.      Breath sounds: Normal breath sounds. No wheezing, rhonchi or rales.   Musculoskeletal:      Right lower leg: No edema.      Left lower leg: No edema.   Skin:     General: Skin is warm and dry.      Capillary Refill: Capillary refill takes less than 2 seconds.   Neurological:      Mental Status: He is alert and oriented to person, place, and time. Mental status is at baseline.   Psychiatric:         Mood and Affect: Mood normal.         Behavior: Behavior normal.         Thought Content: Thought content normal.         Results Review:   I reviewed the patient's new clinical results.    Procedures    Assessment & Plan  Chest pain, atypical  Typical and atypical features with noted risk factors and family history  -Echocardiogram already obtained noting normal EF and grade I impaired relaxation  -Will obtain treadmill stress test to rule out ischemia  -Go to the ER with any new, changing, recurring, or worsening symptoms  -Follow up in 4 weeks to discuss test results with MD  Orders:    Treadmill Stress Test; Future    Type 2 diabetes mellitus without complication, without long-term current use of insulin  -A1c-6.9% 1/2025  -Currently diet controlled  -Managed by PCP         Elevated blood pressure reading  -Mildly elevated BP today  -Does not regularly check  -Goal <130/80  -Will have patient keep log at home with blood pressure cuff sent in to pharmacy  -Avoid NSAIDs and limit salt in diet  Orders:    Blood Pressure Device      Preventative Cardiology:   Tobacco Cessation: N/A   Advance Care Planning: ACP discussion was held with  the patient during this visit. Patient does not have an advance directive, declines further assistance.     Follow Up:   Return in about 4 weeks (around 3/7/2025) for Next scheduled follow up--MD for testing results.      Thank you for allowing me to participate in the care of your patient. Please to not hesitate to contact me with additional questions or concerns.     Elaina Buck, APRN

## 2025-02-07 ENCOUNTER — PATIENT ROUNDING (BHMG ONLY) (OUTPATIENT)
Dept: CARDIOLOGY | Facility: CLINIC | Age: 60
End: 2025-02-07
Payer: COMMERCIAL

## 2025-02-07 ENCOUNTER — OFFICE VISIT (OUTPATIENT)
Dept: CARDIOLOGY | Facility: CLINIC | Age: 60
End: 2025-02-07
Payer: COMMERCIAL

## 2025-02-07 VITALS
HEART RATE: 76 BPM | DIASTOLIC BLOOD PRESSURE: 88 MMHG | WEIGHT: 200 LBS | BODY MASS INDEX: 28.63 KG/M2 | OXYGEN SATURATION: 95 % | SYSTOLIC BLOOD PRESSURE: 142 MMHG | HEIGHT: 70 IN

## 2025-02-07 DIAGNOSIS — R07.89 CHEST PAIN, ATYPICAL: ICD-10-CM

## 2025-02-07 DIAGNOSIS — R03.0 ELEVATED BLOOD PRESSURE READING: ICD-10-CM

## 2025-02-07 DIAGNOSIS — E11.9 TYPE 2 DIABETES MELLITUS WITHOUT COMPLICATION, WITHOUT LONG-TERM CURRENT USE OF INSULIN: Primary | ICD-10-CM

## 2025-02-07 NOTE — PROGRESS NOTES
[facility] welcome email and rounding message has been sent to patient via [employer].

## 2025-02-11 ENCOUNTER — OFFICE VISIT (OUTPATIENT)
Age: 60
End: 2025-02-11
Payer: COMMERCIAL

## 2025-02-11 VITALS
OXYGEN SATURATION: 98 % | BODY MASS INDEX: 28.2 KG/M2 | HEIGHT: 70 IN | SYSTOLIC BLOOD PRESSURE: 128 MMHG | DIASTOLIC BLOOD PRESSURE: 74 MMHG | WEIGHT: 197 LBS | HEART RATE: 76 BPM

## 2025-02-11 DIAGNOSIS — E05.90 HYPERTHYROIDISM: Primary | ICD-10-CM

## 2025-02-11 PROCEDURE — 99204 OFFICE O/P NEW MOD 45 MIN: CPT | Performed by: PHYSICIAN ASSISTANT

## 2025-02-11 NOTE — ASSESSMENT & PLAN NOTE
Discussed thyroid pituitary axis, explained lab results in detail  Patient asked to go ahead and schedule neck ultrasound and get follow-up labs after 4/1/2025  Patient advised to call our office if he starts having increased anxiety, palpitations/tachycardia, or excessive diaphoresis or change in stool frequency  May update treatment after lab results/neck ultrasound reviewed 4/2025  Differential diagnoses explained and include: Thyroiditis, toxic nodule, Graves' disease  Patient is clinically euthyroid on exam and does not wish to be started on any medication unless it is necessary, did discuss risks of prolonged hyperthyroidism including A-fib, stroke, decreased bone density and subsequent increased risk of fracture

## 2025-02-11 NOTE — PROGRESS NOTES
"     Office Note      Date: 2025  Patient Name: Kevin Thurman  MRN: [id number]  : 1965    Chief Complaint   Patient presents with    Abnormal Lab     Abnormal thyroid blood test       History of Present Illness:   Kevin Thurman is a 59 y.o. male who presents for Abnormal Lab (Abnormal thyroid blood test)  . Symptoms are: occasional palpitations, no increased in stool, no anxiety, no tremor. Symptoms began in: years ago. They are fluctuating a bit. Lab work showed overactive function. Patient recently had ed visit for left sided weakness and f/u cardiology for chest pain. He has stress test scheduled this week.  Has not had thyroid ultrasound ordered by pcp.    Subjective      Patient was born where: [place].  Facial radiation exposure: No.  High iodine intake: No  Family hx of thyroid disease: No.    Review of Systems:   Review of Systems   Constitutional:  Negative for diaphoresis.   HENT:  Positive for ear pain.    Respiratory:  Positive for shortness of breath.    Cardiovascular:  Positive for palpitations.   Gastrointestinal:  Negative for diarrhea, nausea and vomiting.   Endocrine: Negative for heat intolerance.   Neurological:  Negative for tremors.       The following portions of the patient's history were reviewed and updated as appropriate: allergies, current medications, past family history, past medical history, past social history, past surgical history, and problem list.    Objective     Visit Vitals  /74 (BP Location: Left arm, Patient Position: Sitting, Cuff Size: Adult)   Pulse 76   Ht 177.8 cm (70\")   Wt 89.4 kg (197 lb)   SpO2 98%   BMI 28.27 kg/m²       Physical Exam:  Physical Exam  Vitals reviewed.   Constitutional:       General: He is not in acute distress.     Appearance: Normal appearance. He is normal weight.   Neck:      Thyroid: No thyroid mass, thyromegaly or thyroid tenderness.   Cardiovascular:      Rate and Rhythm: Normal rate and regular rhythm.      Heart " sounds: Normal heart sounds.   Pulmonary:      Effort: Pulmonary effort is normal.      Breath sounds: Normal breath sounds.   Musculoskeletal:      Cervical back: Neck supple.   Lymphadenopathy:      Cervical: No cervical adenopathy.   Skin:     General: Skin is warm and dry.   Neurological:      General: No focal deficit present.      Mental Status: He is alert and oriented to person, place, and time. Mental status is at baseline.   Psychiatric:         Mood and Affect: Mood normal.         Behavior: Behavior normal.         Thought Content: Thought content normal.         Judgment: Judgment normal.       NO exophthalmos or tremor    Labs:    TSH  TSH Results:  Lab Results   Component Value Date    TSH <0.005 (L) 01/23/2025        Free T4  Free T4   Date Value Ref Range Status   01/23/2025 2.37 (H) 0.93 - 1.70 ng/dL Final     Comment:     Results may be falsely increased if patient taking Biotin.     Free T3  5.8, elevated    Thyrotropin receptor antibody  5.44, elevated    CBC w/DIFF  Lab Results   Component Value Date    WBC 6.24 01/23/2025    RBC 5.64 01/23/2025    HGB 15.0 01/23/2025    HCT 46.6 01/23/2025    MCV 82.6 01/23/2025    MCH 26.6 01/23/2025    MCHC 32.2 01/23/2025    RDW 12.4 01/23/2025    RDWSD 41.7 01/02/2025    MPV 10.8 01/02/2025     01/23/2025    NEUTRORELPCT 51.9 01/23/2025    LYMPHORELPCT 34.8 01/23/2025    MONORELPCT 9.0 01/23/2025    EOSRELPCT 3.5 01/23/2025    BASORELPCT 0.6 01/23/2025    AUTOIGPER 0.4 01/02/2025    NEUTROABS 3.24 01/23/2025    LYMPHSABS 2.17 01/23/2025    MONOSABS 0.56 01/23/2025    EOSABS 0.22 01/23/2025    BASOSABS 0.04 01/23/2025    AUTOIGNUM 0.04 01/02/2025    NRBC 0.0 01/23/2025           Assessment / Plan      Assessment & Plan:  Diagnoses and all orders for this visit:    1. Hyperthyroidism (Primary)  Assessment & Plan:  Discussed thyroid pituitary axis, explained lab results in detail  Patient asked to go ahead and schedule neck ultrasound and get follow-up  labs after 4/1/2025  Patient advised to call our office if he starts having increased anxiety, palpitations/tachycardia, or excessive diaphoresis or change in stool frequency  May update treatment after lab results/neck ultrasound reviewed 4/2025  Differential diagnoses explained and include: Thyroiditis, toxic nodule, Graves' disease  Patient is clinically euthyroid on exam and does not wish to be started on any medication unless it is necessary, did discuss risks of prolonged hyperthyroidism including A-fib, stroke, decreased bone density and subsequent increased risk of fracture    Orders:  -     TSH; Future  -     T4, Free; Future  -     T3, Free; Future  -     Thyroid Stimulating Immunoglobulin; Future       Current Outpatient Medications   Medication Instructions    aspirin 81 mg, Oral, Daily    bisacodyl (Dulcolax) 5 MG EC tablet 4 TABLETS TO BE TAKING AT TIME DIRECTED ON INSTRUCTED THE DAY PRIOR TO COLONOSCOPY.    polyethylene glycol (MiraLax) 17 GM/SCOOP powder  GRAM POWDER WITH 64 OZ OF CLEAR LIQUID. USE AS DIRECTED      F/u 3 months    Electronically signed by: Matt Cuevas PA-C  02/11/2025

## 2025-02-12 ENCOUNTER — PATIENT ROUNDING (BHMG ONLY) (OUTPATIENT)
Dept: ENDOCRINOLOGY | Facility: CLINIC | Age: 60
End: 2025-02-12
Payer: COMMERCIAL

## 2025-02-12 ENCOUNTER — OFFICE VISIT (OUTPATIENT)
Dept: UROLOGY | Facility: CLINIC | Age: 60
End: 2025-02-12
Payer: COMMERCIAL

## 2025-02-12 VITALS
OXYGEN SATURATION: 97 % | WEIGHT: 200.8 LBS | SYSTOLIC BLOOD PRESSURE: 130 MMHG | TEMPERATURE: 97.7 F | BODY MASS INDEX: 28.75 KG/M2 | HEART RATE: 84 BPM | DIASTOLIC BLOOD PRESSURE: 70 MMHG | HEIGHT: 70 IN | RESPIRATION RATE: 14 BRPM

## 2025-02-12 DIAGNOSIS — I86.1 BILATERAL VARICOCELES: Primary | ICD-10-CM

## 2025-02-12 DIAGNOSIS — N50.812 PAIN IN BOTH TESTICLES: ICD-10-CM

## 2025-02-12 DIAGNOSIS — N50.811 PAIN IN BOTH TESTICLES: ICD-10-CM

## 2025-02-12 PROBLEM — R07.89 CHEST PAIN, ATYPICAL: Status: RESOLVED | Noted: 2025-02-05 | Resolved: 2025-02-12

## 2025-02-12 PROBLEM — G45.9 TIA (TRANSIENT ISCHEMIC ATTACK): Status: ACTIVE | Noted: 2018-11-06

## 2025-02-12 PROBLEM — F32.A DEPRESSION: Status: ACTIVE | Noted: 2021-04-08

## 2025-02-12 NOTE — PROGRESS NOTES
A [employer] message has been sent to the patient for [place] [facility] [place]

## 2025-02-12 NOTE — PROGRESS NOTES
Office Visit Testicular Pain      Patient Name: Kevin Thurman  : 1965   MRN: [id number]     Chief Complaint: testicular pain.   Chief Complaint   Patient presents with    Varicocele     Scrotum mass is on left and it goes away sometimes and he can feel in groin area.     Establish Care     Referring Provider: Tong Sorenson*    History of Present Illness: Mr. Kevin Thurman is a 59 y.o. male with history of chronic left testicular pain x 4 years. Scheduled for colonoscopy.      Quality:    Sharp and dull   Provocative factors:  Activity makes it worse  Palliative factors:   Rest makes it better  Radiation:   Along scrotum   Severity:   5/10 currently and 10/10 at its worst  Previous treatments: none    yes Current LUTS: difficulty urinating over the past week. Denies UTI symptoms, fever chills, sweats.  Drinks mostly water and some coffee. Resolved at this time.   no History of vasectomy  no History of kidney stones, recent trauma or injury.  no History of constipation    Subjective      Review of System: ROS reviewed by me and is negative unless otherwise noted in HPI.      Past Medical History:   Past Medical History:   Diagnosis Date    Diabetes mellitus     Stroke     TIA (transient ischemic attack)        Past Surgical History:   Past Surgical History:   Procedure Laterality Date    HERNIA REPAIR      TYMPANOPLASTY         Family History:   Family History   Problem Relation Age of Onset    Heart disease Father         Naot adding names    Cancer Sister         My sister name not needed    Prostate cancer Neg Hx     Kidney cancer Neg Hx         no bladder cancer    Testicular cancer Neg Hx        Social History:   Social History     Socioeconomic History    Marital status:    Tobacco Use    Smoking status: Former     Current packs/day: 0.00     Average packs/day: 0.5 packs/day for 3.0 years (1.5 ttl pk-yrs)     Types: Cigarettes     Start date: 1985     Quit date: 1988  "    Years since quittin.1     Passive exposure: Never    Smokeless tobacco: Never   Vaping Use    Vaping status: Never Used   Substance and Sexual Activity    Alcohol use: Never    Drug use: Never    Sexual activity: Yes     Partners: Female     Birth control/protection: Abstinence, Coitus interruptus       Medications:     Current Outpatient Medications:     aspirin 81 MG EC tablet, Take 1 tablet by mouth Daily., Disp: 90 tablet, Rfl: 0    bisacodyl (Dulcolax) 5 MG EC tablet, 4 TABLETS TO BE TAKING AT TIME DIRECTED ON INSTRUCTED THE DAY PRIOR TO COLONOSCOPY. (Patient not taking: Reported on 2025), Disp: 4 tablet, Rfl: 0    polyethylene glycol (MiraLax) 17 GM/SCOOP powder,  GRAM POWDER WITH 64 OZ OF CLEAR LIQUID. USE AS DIRECTED (Patient not taking: Reported on 2025), Disp: 238 g, Rfl: 0    Allergies:   No Known Allergies    Objective     Physical Exam:   Vital Signs:   Vitals:    25 1450   BP: 130/70   BP Location: Left arm   Patient Position: Sitting   Cuff Size: Adult   Pulse: 84   Resp: 14   Temp: 97.7 °F (36.5 °C)   TempSrc: Temporal   SpO2: 97%   Weight: 91.1 kg (200 lb 12.8 oz)   Height: 178 cm (70.08\")   PainSc:   5   PainLoc: Scrotum  Comment: left scrotum and left groin     Body mass index is 28.75 kg/m².   Physical Exam  Vitals and nursing note reviewed. Exam conducted with a chaperone present.   Constitutional:       General: He is not in acute distress.     Appearance: Normal appearance. He is not ill-appearing.   HENT:      Head: Normocephalic and atraumatic.   Pulmonary:      Effort: Pulmonary effort is normal.   Abdominal:      General: Abdomen is flat.      Hernia: There is no hernia in the left inguinal area or right inguinal area.   Genitourinary:     Pubic Area: No rash.       Penis: Normal.       Testes:         Right: Tenderness and varicocele (small) present. Mass or testicular hydrocele not present.         Left: Tenderness and varicocele (small) present. Mass or " testicular hydrocele not present.      Epididymis:      Right: Normal.      Left: Normal.   Skin:     General: Skin is warm and dry.   Neurological:      General: No focal deficit present.      Mental Status: He is alert and oriented to person, place, and time.   Psychiatric:         Mood and Affect: Mood normal.         Behavior: Behavior normal.     Labs  Brief Urine Lab Results       None          Lab Results   Component Value Date    GLUCOSE 140 (H) 01/23/2025    CALCIUM 9.7 01/23/2025     01/23/2025    K 4.5 01/23/2025    CO2 27.7 01/23/2025     01/23/2025    BUN 9 01/23/2025    CREATININE 0.77 01/23/2025    BCR 11.7 01/23/2025    ANIONGAP 9.8 01/02/2025       Radiologic Studies  US Scrotum & Testicles  Result Date: 2/7/2025  Impression: 1.Both testicles are normal. The right and left epididymis is normal. 2.Small bilateral hydroceles. 3.Bilateral varicoceles.    MRI Brain Without Contrast  Result Date: 1/2/2025  Suboptimal exam secondary to motion artifact decreasing the sensitivity of this exam.  Atrophy.  No acute abnormality identified.  Mild sinus disease as described.    This report was signed and finalized on 1/2/2025 4:30 PM by Dixie Treviño MD.      CT Angiogram Head w AI Analysis of LVO  Result Date: 1/2/2025  No significant arterial stenosis identified intracranial.   Mild plaque causing less than 50% stenosis bilateral internal carotid arteries..  Asymmetric enlargement choroid plexus right occipital horn, papilloma not excluded. Consider short-term follow-up.    CTDI: 34.78 mGy DLP:626.09 mGy.cm   This report was signed and finalized on 1/2/2025 2:32 PM by Dixie Treviño MD.      CT Angiogram Neck  Result Date: 1/2/2025  No significant arterial stenosis identified intracranial.   Mild plaque causing less than 50% stenosis bilateral internal carotid arteries..  Asymmetric enlargement choroid plexus right occipital horn, papilloma not excluded. Consider short-term follow-up.    CTDI: 34.78  mGy DLP:626.09 mGy.cm   This report was signed and finalized on 1/2/2025 2:32 PM by Dixie Treviño MD.      XR Chest 1 View  Result Date: 1/2/2025  No acute cardiopulmonary process.      This report was signed and finalized on 1/2/2025 2:24 PM by Dixie Treviño MD.      CT CEREBRAL PERFUSION WITH & WITHOUT CONTRAST  Result Date: 1/2/2025  No evidence of core infarct or ischemic penumbra. No evidence of large vessel occlusion.      CTDI: DLP:     This study was performed with techniques to keep radiation doses as low as reasonably achievable (ALARA). Individualized dose reduction techniques using automated exposure control or adjustment of mA and/or kV according to the patient size were employed.   This report was signed and finalized on 1/2/2025 2:05 PM by Dixie Treviño MD.      CT Head Without Contrast Stroke Protocol    Result Date: 1/2/2025  Atrophy  without acute process.     CTDI: DLP:  This report was signed and finalized on 1/2/2025 2:04 PM by Dixie Treviño MD.        Assessment / Plan      Assessment  Mr. Thurman is a 59 y.o. male who presents with chronic left scrotal pain for the past 4 years. No palpable masses.  Bilateral small palpable varicoceles.  No palpable hydroceles.     We discussed different strategies for management including conservative therapy, pain medications (gabapentin, nortriptyline) and surgical interventions (cord denervation).    Diagnoses and all orders for this visit:    1. Bilateral varicoceles (Primary)  -     Ambulatory Referral to Physical Therapy for Evaluation & Treatment    2. Pain in both testicles  -     Ambulatory Referral to Physical Therapy for Evaluation & Treatment    Plan  1.  Scrotal support continuously, especially with increased activity.  2.  Warm bath soaks twice daily.  3.  NSAIDs for pain as instructed with food.  4. PFPT   5. If no improvement with PFPT will discuss surgical interventions further at his next visit.      Follow Up:   Return in about 3 months (around  5/12/2025) for Next scheduled follow up, with Savanah .    LEAH Saldaña, MSN, FNP-C  [facility] [place] [facility]

## 2025-02-21 ENCOUNTER — OFFICE VISIT (OUTPATIENT)
Dept: FAMILY MEDICINE CLINIC | Facility: CLINIC | Age: 60
End: 2025-02-21
Payer: COMMERCIAL

## 2025-02-21 VITALS
RESPIRATION RATE: 16 BRPM | DIASTOLIC BLOOD PRESSURE: 82 MMHG | OXYGEN SATURATION: 97 % | HEIGHT: 70 IN | HEART RATE: 78 BPM | BODY MASS INDEX: 28.66 KG/M2 | WEIGHT: 200.2 LBS | SYSTOLIC BLOOD PRESSURE: 128 MMHG

## 2025-02-21 DIAGNOSIS — H61.21 IMPACTED CERUMEN OF RIGHT EAR: ICD-10-CM

## 2025-02-21 DIAGNOSIS — E11.9 TYPE 2 DIABETES MELLITUS WITHOUT COMPLICATION, WITHOUT LONG-TERM CURRENT USE OF INSULIN: ICD-10-CM

## 2025-02-21 DIAGNOSIS — E11.9 ENCOUNTER FOR DIABETIC FOOT EXAM: ICD-10-CM

## 2025-02-21 DIAGNOSIS — I86.1 VARICOCELE: ICD-10-CM

## 2025-02-21 DIAGNOSIS — N52.9 ERECTILE DYSFUNCTION, UNSPECIFIED ERECTILE DYSFUNCTION TYPE: ICD-10-CM

## 2025-02-21 DIAGNOSIS — H66.91 RIGHT OTITIS MEDIA, UNSPECIFIED OTITIS MEDIA TYPE: Primary | ICD-10-CM

## 2025-02-21 DIAGNOSIS — E05.90 HYPERTHYROIDISM: ICD-10-CM

## 2025-02-21 DIAGNOSIS — R07.9 CHEST PAIN, UNSPECIFIED TYPE: ICD-10-CM

## 2025-02-21 DIAGNOSIS — G43.709 CHRONIC MIGRAINE WITHOUT AURA WITHOUT STATUS MIGRAINOSUS, NOT INTRACTABLE: ICD-10-CM

## 2025-02-21 LAB
EXPIRATION DATE: NORMAL
Lab: NORMAL
POC CREATININE URINE: 10
POC MICROALBUMIN URINE: 10

## 2025-02-21 PROCEDURE — 99214 OFFICE O/P EST MOD 30 MIN: CPT | Performed by: STUDENT IN AN ORGANIZED HEALTH CARE EDUCATION/TRAINING PROGRAM

## 2025-02-21 PROCEDURE — 82044 UR ALBUMIN SEMIQUANTITATIVE: CPT | Performed by: STUDENT IN AN ORGANIZED HEALTH CARE EDUCATION/TRAINING PROGRAM

## 2025-02-21 NOTE — PROGRESS NOTES
Follow Up Office Visit      Date: 2025   Patient Name: Kevin Thurman  : 1965   MRN: [id number]     Chief Complaint:    Chief Complaint   Patient presents with    Diabetes     Due for urine micro, eye/foot exam    Migraine     About the same, hasn't seen neuro yet, bothered most by bright lights       History of Present Illness: Kevin Thurman is a 59 y.o. male who is here today for follow up.    Patient reports that he has seen cardiology and has had stress test. Patient reports that he is unsure when he follow up for cardiology is.    Patient reports he has seen endocrinology. Patient reports that his US of his thyroid has not been scheduled yet.    Patient reports that he has seen urology and is trying pelvic floor therapy and see if that helps varicocele before surgery.    Patient reports that he is agreeable to foot exam and urine microalbumin. Patient reports that he is trying to control his diabetes with diet and exercise at this time. Patient is open to medication if he cannot improve A1c in the next several months. Patient will be setting up eye exam soon.    Patient reports that he is still having migraines that are occurring roughly 1-2 times a week. Patient reports that these do appear to be more common with cold weather and bright lights. We do discuss medications such as Topomax and sumatriptan though patient at present declines.    Patient reports that he does have pressure/discomfort in his right ear.    Patient would like to discuss erectile dysfunction after urology evaluation/treatment.    Patient reports his colonoscopy is upcoming.    Subjective     I have reviewed the patients family history, social history, past medical history, past surgical history and have updated it as appropriate.     Medications:     Current Outpatient Medications:     aspirin 81 MG EC tablet, Take 1 tablet by mouth Daily., Disp: 90 tablet, Rfl: 0    amoxicillin-clavulanate (AUGMENTIN) 875-125 MG per  "tablet, Take 1 tablet by mouth 2 (Two) Times a Day for 7 days., Disp: 14 tablet, Rfl: 0    bisacodyl (Dulcolax) 5 MG EC tablet, 4 TABLETS TO BE TAKING AT TIME DIRECTED ON INSTRUCTED THE DAY PRIOR TO COLONOSCOPY. (Patient not taking: Reported on 2/7/2025), Disp: 4 tablet, Rfl: 0    polyethylene glycol (MiraLax) 17 GM/SCOOP powder,  GRAM POWDER WITH 64 OZ OF CLEAR LIQUID. USE AS DIRECTED, Disp: 238 g, Rfl: 0    Allergies:   No Known Allergies    Objective     Physical Exam:     Vital Signs:   Vitals:    02/21/25 0808   BP: 128/82   Pulse: 78   Resp: 16   SpO2: 97%   Weight: 90.8 kg (200 lb 3.2 oz)   Height: 177.8 cm (70\")     Body mass index is 28.73 kg/m².          Physical Exam     General:  Well appearing adult male in no acute distress. Alert and oriented. Vitals reviewed and are within normal limits.  Head/ENT: Atraumatic. Right tympanic membrane obscured by cerumen impaction. Left tympanic membrane unremarkable.  Neck: Anatomy appears symmetrical. There is no palpable lymphadenopathy to palpation. Thyroid appears normal.  Cardiac: Regular rate and rhythm to auscultation.   Pulmonary: Lungs are clear to auscultation bilaterally.  Extremities: There is no appreciated lower extremity edema bilaterally to palpation. Skin of feet is unremarkable. There is no evidence of calluses or ulceration. Bilateral pedal pulses are normal. Vibratory and fine sensation are normal.  Integumentary/Skin: Skin appears unremarkable from observable skin surfaces.   Neurological: Normal gait and speech. See extremities.  Behavioral/Psych: Patient behavior/demeanor appears consistent with reported age.     Procedures    Assessment / Plan      1. Type 2 diabetes mellitus without complication, without long-term current use of insulin  - Microalbumin / Creatinine Urine Ratio - Urine, Clean Catch  - POCT microalbumin    2. Chest pain, unspecified type    3. Varicocele    4. Hyperthyroidism    5. Erectile dysfunction, unspecified " erectile dysfunction type    6. Chronic migraine without aura without status migrainosus, not intractable    7. Impacted cerumen of right ear    8. Right otitis media, unspecified otitis media type  - amoxicillin-clavulanate (AUGMENTIN) 875-125 MG per tablet; Take 1 tablet by mouth 2 (Two) Times a Day for 7 days.  Dispense: 14 tablet; Refill: 0     Assessment & Plan  1. Type 2 diabetes mellitus:  - Recent A1c level: 6.9  - Managing diabetes through dietary modifications and physical activity  - Prefers to postpone diabetic medication initiation  - Open to reconsidering medication if no improvement in A1c levels by next visit  - Regular assessments to be maintained  - Diabetic foot exam in office is normal today.  - Urine microalbumin normal today. Protein/creatinine sent.  - Establishing care with an ophthalmologist for diabetic eye examination  - Reassessment during next visit    2. Chest pain:  - Consulted with a cardiologist since last visit.   - Previous echocardiogram results: Normal systolic with EF 56-60%. Grade 1 Diastolic Dysfunction  - Stress test performed and reviewed: normal results  - Advised to follow up with cardiologist. Have informed patient of follow up.    3. Blood pressure management:  - Current blood pressure readings within acceptable limits  - Ongoing monitoring to continue    4. Varicocele:  - Under care of a urologist  - Undergoing pelvic floor therapy  - Surgical intervention considered if no improvement from pelvic floor therapy    5. Hyperthyroidism:  - Recent lab results suggest hyperthyroid state  - Thyroid examination shows no evidence of nodules or abnormalities  - Seeing endocrinology now, currently holding off medication. Advised to continue follow-ups with endocrinologist  - Ultrasound of thyroid ordered but not yet scheduled. Previously postponed due to patient's anxiety about the procedure. Will get this rescheduled today.    6. Erectile dysfunction:  - Considering initiation of  medication pending completion of urology evaluation and management  - Reassessment to follow  - Potential medications discussed: Viagra or Cialis    7. Migraines:  - Symptoms consistent with migraines, exacerbated by light and cold weather  - Medications discussed: Topamax or sumatriptan  - Prefers to defer medication at this time  - Will consider medication option before next visit    8. Right ear fullness and change in hearing:  - Impacted cerumen observed in right ear upon physical examination  - Have discussed that symptoms likely related to cerumen impaction though cannot rule out infection without visualization.  - Augmentin prescribed to see if condition improves  - Advised to use Debrox drops for several days followed by over-the-counter earwax removal kit  - Left ear appears normal    9. Health Maintenance  - Colonoscopy upcoming.     Follow-up:  - Patient to follow up in 2 months       Patient or patient representative verbalized consent for the use of Ambient Listening during the visit with  Tong Sorenson MD for chart documentation. 2/21/2025  09:27 EST     Follow Up:   Return in about 2 months (around 4/21/2025).      MD ANGEL [facility]  [address] [place] [address] [address] [place] [address]  Fax [phone]  Phone [phone]

## 2025-02-22 LAB
ALBUMIN/CREAT UR: <5 MG/G CREAT (ref 0–29)
CREAT UR-MCNC: 60.7 MG/DL
MICROALBUMIN UR-MCNC: <3 UG/ML

## 2025-03-14 ENCOUNTER — TELEPHONE (OUTPATIENT)
Dept: SURGERY | Facility: CLINIC | Age: 60
End: 2025-03-14
Payer: COMMERCIAL

## 2025-03-14 NOTE — TELEPHONE ENCOUNTER
SPOKE WITH PT HE HAS RESCHEDULED TO 05/02/2025 @ [facility] IN [facility] WAS NOTIFIED

## 2025-04-23 ENCOUNTER — OFFICE VISIT (OUTPATIENT)
Dept: NEUROLOGY | Facility: CLINIC | Age: 60
End: 2025-04-23
Payer: COMMERCIAL

## 2025-04-23 VITALS
DIASTOLIC BLOOD PRESSURE: 68 MMHG | BODY MASS INDEX: 28.66 KG/M2 | OXYGEN SATURATION: 97 % | HEART RATE: 97 BPM | WEIGHT: 200.18 LBS | SYSTOLIC BLOOD PRESSURE: 122 MMHG | HEIGHT: 70 IN

## 2025-04-23 DIAGNOSIS — Z86.73 HISTORY OF TIA (TRANSIENT ISCHEMIC ATTACK): Primary | ICD-10-CM

## 2025-04-23 RX ORDER — ATORVASTATIN CALCIUM 20 MG/1
20 TABLET, FILM COATED ORAL DAILY
Qty: 30 TABLET | Refills: 11 | Status: SHIPPED | OUTPATIENT
Start: 2025-04-23 | End: 2026-04-23

## 2025-04-23 NOTE — PROGRESS NOTES
Neuro Office Visit      Encounter Date: 2025   Patient Name: Kevin Thurman  : 1965   MRN: [id number]   PCP:  Tong Sorenson MD     Chief Complaint:    Chief Complaint   Patient presents with    new patient       History of Present Illness: Kevin Thurman is a 59 y.o. male who is here today in Neurology for  ER follow up of TIA    Initial consult visit 2025:  PMH of TIA, Type 2 diabetes, hyperthyroidism, depression, stroke is listed on his chart but he denies prior CVA    Kevin was seen in the ER on 2025 for evaluation of paresthesias in his left arm that extended into his left face.  No focal weakness.  He reported prior TIA 4 years ago when he lived in [place].  Does not take any medications, no anticoagulants.  No fever or infectious symptoms.  He felt well prior to the onset of the symptoms.  It was felt on exam that he did have subtle left lower facial droop and left arm drift.  CT scan of the head without contrast, CT perfusion, CT angio of the head and neck, MRI of the brain, and chest x-ray showed no acute abnormalities.     CT angiogram the neck does report mild plaque causing less than 50% stenosis of bilateral internal carotid arteries.  Asymmetric enlargement of the cord plexus in the right occipital horn on CT scan of the head papilloma is not excluded. No significant intracranial arterial stenosis.    Dr. Lit Valle was consulted and felt that he had TIA vs. Complex migraine and it was recommended that he undergo cardiac echo, hemoglobin A1c, to start atorvastatin 80 mg daily, also to take aspirin 81 mg daily with clopidogrel load of 300 mg +75 mg for maintenance.  He was to follow-up in the stroke clinic. He was not prescribed Plavix or Atorvastatin at ER discharge.    Last Echo: 2025--EF-58%, Left ventricular diastolic function is consistent with (grade I) impaired relaxation. No significant valvular abnormalities noted.  Prior Holter Monitor: None  A1c-6.9%  1/2025 lipid panel collected on 1/23/2025 showed total cholesterol 154, triglycerides 65, HDL 47, LDL 94.  Hemoglobin A1c was 6.9.  He did undergo exercise stress test on 2/14/2025 which showed a normal exercise treadmill stress test.  Low risk Duke treadmill score.    He recalls another episode 4 years ago as well with one sided weakness, did take ASA for a period of time, reports that he was overweight at the time and that diabetes was not well controlled, reports that his numbness at that time was right sided, numbness lasted about an hour at that time    He recalls that the episode that happened prompting his ER presentation on 1/2/2025 - he was going to the bank and had left sided hand numbness that progressed into the face, lasted about 35 minutes, felt somewhat disoriented, some left sided weakness, symptoms resolved without residual.    He had a moment recently in which he had trouble with speech - reports that he took a nap and the symptoms were better after that, he reports that brief episodes of feeling like he is in a cloud happen on average every other week, does note that his sleep is poor. Denies concern for JERALD.     He reports compliance with ASA 81 mg daily since ER discharge. He is a remote smoker quit 20 years ago, no alcohol use, no drug use, no immediate family history of CVA.  He is right handed. He has noted decreased hearing in his right ear over time and has been told that this is from cerumen buildup.     He has not had holter monitor, does not take a statin, has not had hypercoagulable workup. Does report an aunt who had a clotting disorder, he does not know what type.   He remains physically active. He works as an .    Subjective      Review of Systems   Constitutional: Negative.    HENT: Negative.     Eyes: Negative.    Respiratory: Negative.     Cardiovascular: Negative.    Gastrointestinal: Negative.    Genitourinary: Negative.    Musculoskeletal:  Negative for gait problem.    Skin: Negative.    Neurological:  Positive for speech difficulty. Negative for weakness (None since ER episode) and numbness (None since ER episode).   Psychiatric/Behavioral:  Positive for sleep disturbance.           Past Medical History:   Past Medical History:   Diagnosis Date    Diabetes mellitus     Stroke     TIA (transient ischemic attack)        Past Surgical History:   Past Surgical History:   Procedure Laterality Date    HERNIA REPAIR      TYMPANOPLASTY         Family History:   Family History   Problem Relation Age of Onset    Heart disease Father         Naot adding names    Cancer Sister         My sister name not needed    Prostate cancer Neg Hx     Kidney cancer Neg Hx         no bladder cancer    Testicular cancer Neg Hx        Social History:   Social History     Socioeconomic History    Marital status:    Tobacco Use    Smoking status: Former     Current packs/day: 0.00     Average packs/day: 0.5 packs/day for 3.0 years (1.5 ttl pk-yrs)     Types: Cigarettes     Start date: 1985     Quit date: 1988     Years since quittin.3     Passive exposure: Never    Smokeless tobacco: Never   Vaping Use    Vaping status: Never Used   Substance and Sexual Activity    Alcohol use: Never    Drug use: Never    Sexual activity: Yes     Partners: Female     Birth control/protection: Abstinence, Coitus interruptus       Medications:     Current Outpatient Medications:     aspirin 81 MG EC tablet, Take 1 tablet by mouth Daily., Disp: 90 tablet, Rfl: 0    polyethylene glycol (MiraLax) 17 GM/SCOOP powder,  GRAM POWDER WITH 64 OZ OF CLEAR LIQUID. USE AS DIRECTED (Patient taking differently:  GRAM POWDER WITH 64 OZ OF CLEAR LIQUID. USE AS DIRECTED   for colonoscopy), Disp: 238 g, Rfl: 0    atorvastatin (Lipitor) 20 MG tablet, Take 1 tablet by mouth Daily., Disp: 30 tablet, Rfl: 11    bisacodyl (Dulcolax) 5 MG EC tablet, 4 TABLETS TO BE TAKING AT TIME DIRECTED ON INSTRUCTED THE DAY  "PRIOR TO COLONOSCOPY. (Patient not taking: Reported on 4/23/2025), Disp: 4 tablet, Rfl: 0    Allergies:   No Known Allergies      Objective     Objective:    /68   Pulse 97   Ht 177.8 cm (70\")   Wt 90.8 kg (200 lb 2.8 oz)   SpO2 97%   BMI 28.72 kg/m²   Body mass index is 28.72 kg/m².    Physical Exam  Vitals reviewed.   Constitutional:       Appearance: Normal appearance.   HENT:      Head: Normocephalic and atraumatic.      Mouth/Throat:      Mouth: Mucous membranes are moist.      Pharynx: Oropharynx is clear.   Pulmonary:      Effort: Pulmonary effort is normal. No respiratory distress.   Skin:     General: Skin is warm and dry.   Neurological:      Mental Status: He is alert.          Neurology Exam:    General apperance: NAD.     Mental status: Alert, awake and oriented to time place and person.    Fund of knowledge:  Normal.     Language and Speech: No aphasia or dysarthria.    Naming , Repetition and Comprehension:  Can name objects, repeat a sentence and follow commands. Speech is clear and fluent with good repetition, comprehension, and naming.    Cranial Nerves:   CN II: Visual fields are full. Intact. Pupils - PERRLA  CN III, IV and VI: Extraocular movements are intact. Normal saccades.   CN V: Facial sensation is intact.   CN VII: Muscles of facial expression reveal no asymmetry. Intact.   CN VIII: Hearing is intact.  CN IX and X: Palate elevates symmetrically. Intact  CN XI: Shoulder shrug is intact.   CN XII: Tongue is midline without evidence of atrophy or fasciculation.     Motor:  Right UE muscle strength 5/5. Normal tone.     Left UE muscle strength 5/5. Normal tone.      Right LE muscle strength 5/5. Normal tone.     Left LE muscle strength 5/5. Normal tone.      Sensory: Normal light touch sensation bilaterally.    DTRs: 2+ bilaterally in upper and lower extremities.    Coordination: Normal finger-to-nose, heel to kidd B/L.    Romberg: Negative.    Gait: Normal.          Results: "   Imaging:   US Scrotum & Testicles  Result Date: 2/7/2025  Impression: 1.Both testicles are normal. The right and left epididymis is normal. 2.Small bilateral hydroceles. 3.Bilateral varicoceles. Electronically Signed: Dao Lang MD  2/7/2025 5:20 PM EST  Workstation ID: [id number]    MRI Brain Without Contrast  Result Date: 1/2/2025  Suboptimal exam secondary to motion artifact decreasing the sensitivity of this exam.  Atrophy.  No acute abnormality identified.  Mild sinus disease as described.    This report was signed and finalized on 1/2/2025 4:30 PM by Dixie Treviño MD.      CT Angiogram Head w AI Analysis of LVO  Result Date: 1/2/2025  No significant arterial stenosis identified intracranial.   Mild plaque causing less than 50% stenosis bilateral internal carotid arteries..  Asymmetric enlargement choroid plexus right occipital horn, papilloma not excluded. Consider short-term follow-up.    CTDI: 34.78 mGy DLP:626.09 mGy.cm   This report was signed and finalized on 1/2/2025 2:32 PM by Dixie Treviño MD.      CT Angiogram Neck  Result Date: 1/2/2025  No significant arterial stenosis identified intracranial.   Mild plaque causing less than 50% stenosis bilateral internal carotid arteries..  Asymmetric enlargement choroid plexus right occipital horn, papilloma not excluded. Consider short-term follow-up.    CTDI: 34.78 mGy DLP:626.09 mGy.cm   This report was signed and finalized on 1/2/2025 2:32 PM by Dixie Treviño MD.      XR Chest 1 View  Result Date: 1/2/2025  No acute cardiopulmonary process.      This report was signed and finalized on 1/2/2025 2:24 PM by Dixie Treviño MD.      CT CEREBRAL PERFUSION WITH & WITHOUT CONTRAST  Result Date: 1/2/2025  No evidence of core infarct or ischemic penumbra. No evidence of large vessel occlusion.      CTDI: DLP:     This study was performed with techniques to keep radiation doses as low as reasonably achievable (ALARA). Individualized dose reduction techniques using automated  exposure control or adjustment of mA and/or kV according to the patient size were employed.   This report was signed and finalized on 1/2/2025 2:05 PM by Dixie Treviño MD.      CT Head Without Contrast Stroke Protocol  Result Date: 1/2/2025  Atrophy  without acute process.     CTDI: DLP:  This report was signed and finalized on 1/2/2025 2:04 PM by Dixie Treviño MD.         Labs:   Lab Results   Component Value Date    GLUCOSE 140 (H) 01/23/2025    BUN 9 01/23/2025    CREATININE 0.77 01/23/2025     01/23/2025    K 4.5 01/23/2025     01/23/2025    CALCIUM 9.7 01/23/2025    PROTEINTOT 6.6 01/23/2025    ALBUMIN 4.0 01/23/2025    ALT 18 01/23/2025    AST 14 01/23/2025    ALKPHOS 104 01/23/2025    BILITOT 0.5 01/23/2025    GLOB 2.6 01/23/2025    AGRATIO 1.5 01/23/2025    BCR 11.7 01/23/2025    ANIONGAP 9.8 01/02/2025    EGFR 104.4 01/23/2025     WBC   Date Value Ref Range Status   01/23/2025 6.24 3.40 - 10.80 10*3/mm3 Final     RBC   Date Value Ref Range Status   01/23/2025 5.64 4.14 - 5.80 10*6/mm3 Final     Hemoglobin   Date Value Ref Range Status   01/23/2025 15.0 13.0 - 17.7 g/dL Final   01/02/2025 15.5 13.0 - 17.7 g/dL Final     Hematocrit   Date Value Ref Range Status   01/23/2025 46.6 37.5 - 51.0 % Final   01/02/2025 48.7 37.5 - 51.0 % Final     MCV   Date Value Ref Range Status   01/23/2025 82.6 79.0 - 97.0 fL Final   01/02/2025 83.7 79.0 - 97.0 fL Final     MCH   Date Value Ref Range Status   01/23/2025 26.6 26.6 - 33.0 pg Final   01/02/2025 26.6 26.6 - 33.0 pg Final     MCHC   Date Value Ref Range Status   01/23/2025 32.2 31.5 - 35.7 g/dL Final   01/02/2025 31.8 31.5 - 35.7 g/dL Final     RDW   Date Value Ref Range Status   01/23/2025 12.4 12.3 - 15.4 % Final   01/02/2025 13.7 12.3 - 15.4 % Final     RDW-SD   Date Value Ref Range Status   01/02/2025 41.7 37.0 - 54.0 fl Final     MPV   Date Value Ref Range Status   01/02/2025 10.8 6.0 - 12.0 fL Final     Platelets   Date Value Ref Range Status    01/23/2025 239 140 - 450 10*3/mm3 Final   01/02/2025 236 140 - 450 10*3/mm3 Final     Neutrophil Rel %   Date Value Ref Range Status   01/23/2025 51.9 42.7 - 76.0 % Final     Neutrophil %   Date Value Ref Range Status   01/02/2025 66.9 42.7 - 76.0 % Final     Lymphocyte Rel %   Date Value Ref Range Status   01/23/2025 34.8 19.6 - 45.3 % Final     Lymphocyte %   Date Value Ref Range Status   01/02/2025 23.7 19.6 - 45.3 % Final     Monocyte Rel %   Date Value Ref Range Status   01/23/2025 9.0 5.0 - 12.0 % Final     Monocyte %   Date Value Ref Range Status   01/02/2025 6.9 5.0 - 12.0 % Final     Eosinophil Rel %   Date Value Ref Range Status   01/23/2025 3.5 0.3 - 6.2 % Final     Eosinophil %   Date Value Ref Range Status   01/02/2025 1.6 0.3 - 6.2 % Final     Basophil Rel %   Date Value Ref Range Status   01/23/2025 0.6 0.0 - 1.5 % Final     Basophil %   Date Value Ref Range Status   01/02/2025 0.5 0.0 - 1.5 % Final     Immature Grans %   Date Value Ref Range Status   01/02/2025 0.4 0.0 - 0.5 % Final     Neutrophils Absolute   Date Value Ref Range Status   01/23/2025 3.24 1.70 - 7.00 10*3/mm3 Final     Neutrophils, Absolute   Date Value Ref Range Status   01/02/2025 7.03 (H) 1.70 - 7.00 10*3/mm3 Final     Lymphocytes Absolute   Date Value Ref Range Status   01/23/2025 2.17 0.70 - 3.10 10*3/mm3 Final     Lymphocytes, Absolute   Date Value Ref Range Status   01/02/2025 2.49 0.70 - 3.10 10*3/mm3 Final     Monocytes Absolute   Date Value Ref Range Status   01/23/2025 0.56 0.10 - 0.90 10*3/mm3 Final     Monocytes, Absolute   Date Value Ref Range Status   01/02/2025 0.72 0.10 - 0.90 10*3/mm3 Final     Eosinophils Absolute   Date Value Ref Range Status   01/23/2025 0.22 0.00 - 0.40 10*3/mm3 Final     Eosinophils, Absolute   Date Value Ref Range Status   01/02/2025 0.17 0.00 - 0.40 10*3/mm3 Final     Basophils Absolute   Date Value Ref Range Status   01/23/2025 0.04 0.00 - 0.20 10*3/mm3 Final     Basophils, Absolute   Date  Value Ref Range Status   01/02/2025 0.05 0.00 - 0.20 10*3/mm3 Final     Immature Grans, Absolute   Date Value Ref Range Status   01/02/2025 0.04 0.00 - 0.05 10*3/mm3 Final     nRBC   Date Value Ref Range Status   01/23/2025 0.0 0.0 - 0.2 /100 WBC Final   01/02/2025 0.0 0.0 - 0.2 /100 WBC Final         Assessment / Plan      Assessment/Plan:   Diagnoses and all orders for this visit:    1. History of TIA (transient ischemic attack) (Primary)  -     RICKY  -     Antithrombin III  -     Factor 5 Leiden  -     Homocysteine  -     Lupus Anticoagulant Panel  -     Protein C Activity  -     Protein S Functional  -     Factor II, DNA Analysis  -     Holter Monitor - 72 Hour Up To 15 Days; Future  -     atorvastatin (Lipitor) 20 MG tablet; Take 1 tablet by mouth Daily.  Dispense: 30 tablet; Refill: 11  -     Anticardiolipin Antibody, IgG / M, Qn; Future  -     Beta-2 Glycoprotein Antibodies; Future  -     MTHFR Mutation; Future  -     Sedimentation Rate; Future  -     C-reactive Protein; Future       Kevin Thurman is in neurology clinic to f/u from prior TIA. He has been compliant with ASA 81 mg daily. CT scan of the head without contrast, CT perfusion, CT angio of the head and neck, MRI of the brain, and chest x-ray showed no acute abnormalities. CT angiogram the neck does report mild plaque causing less than 50% stenosis of bilateral internal carotid arteries.  Asymmetric enlargement of the cord plexus in the right occipital horn on CT scan of the head papilloma is not excluded. No significant intracranial arterial stenosis. Last cardiac echo: 2/2025--EF-58%, Left ventricular diastolic function is consistent with (grade I) impaired relaxation. No significant valvular abnormalities noted.  Prior Holter Monitor: None. Hemoglobin A1c-6.9%. Lipid panel collected on 1/23/2025 showed total cholesterol 154, triglycerides 65, HDL 47, LDL 94.  Hemoglobin A1c was 6.9.  He did undergo exercise stress test on 2/14/2025 which showed a normal  "exercise treadmill stress test.  Low risk Duke treadmill score. Given his LDL of 94 I have started Lipitor today to help decrease LDL to <70. He also needs holter monitor to further assess for any atrial arrhythmias that could increase stroke risk. Recommend continuation of ASA 81 mg daily. I have also discussed with Dr. Edmondson who agreed with stroke workup and advised hypercoagulable workup given overall young age to have now had two TIA episodes. The first episode appears to have had increased vascular risk factors of obesity and poorly controlled A1c (11.5 at that time in 2018) however most recent episode occurred without much regarding vascular risk factors. Pending result of holter monitor and additional blood work additional treatment options will be discussed. We also discussed today that the brief episodes of \"feeling like he is in a cloud\" and 1 time episode of some speech disturbance could be from poor sleep or thyroid abnormalities, however I have advised additional workup with EEG which was deferred at present, he would like to optimize sleep first and see if the episodes reoccur, he is going to trial low dose melatonin at bedtime. He was advised regarding stroke symptoms and to seek emergent care for any stroke like symptoms. Will plan to see back in clinic in 8 weeks or sooner for any concerns.    Patient Education:       Reviewed medications, potential side effects and signs and symptoms to report. Discussed risk versus benefits of treatment plan with patient and/or family-including medications, labs and radiology that may be ordered. Addressed questions and concerns during visit. Patient and/or family verbalized understanding and agree with plan. Instructed to call the office with any questions and report to ER with any life-threatening symptoms.     Follow Up:   Return in about 8 weeks (around 6/18/2025).    I spent 60 minutes in the care of this patient. I personally spent 50 percent of this time " counseling and discussing diagnosis, diagnostic testing, evaluation, treatment options, and management .       During this visit the following were done:  Labs Reviewed [x]    Labs Ordered []    Radiology Reports Reviewed [x]    Radiology Ordered []    PCP Records Reviewed []    Referring Provider Records Reviewed []    ER Records Reviewed [x]    Hospital Records Reviewed []    History Obtained From Family []    Radiology Images Reviewed [x]    Other Reviewed [x]    Records Requested []      LEAH Ingram  [facility]  [address] [address] [phone]

## 2025-04-30 ENCOUNTER — TELEPHONE (OUTPATIENT)
Dept: SURGERY | Facility: CLINIC | Age: 60
End: 2025-04-30
Payer: COMMERCIAL

## 2025-04-30 NOTE — TELEPHONE ENCOUNTER
TRIED TO CALL BACK BUT PT DID NOT ANSWER NOR DOES HE HAVE VM. WILL TRY BACK

## 2025-04-30 NOTE — TELEPHONE ENCOUNTER
PT CALLED BACK HAS BEEN RESCHEDULED FOR 05/30/2025 @ [facility] IN [facility] WAS NOTIFIED

## 2025-05-05 ENCOUNTER — OFFICE VISIT (OUTPATIENT)
Dept: CARDIOLOGY | Facility: HOSPITAL | Age: 60
End: 2025-05-05
Payer: COMMERCIAL

## 2025-05-05 ENCOUNTER — HOSPITAL ENCOUNTER (OUTPATIENT)
Dept: CARDIOLOGY | Facility: HOSPITAL | Age: 60
Discharge: HOME | End: 2025-05-05
Payer: COMMERCIAL

## 2025-05-05 VITALS
DIASTOLIC BLOOD PRESSURE: 74 MMHG | OXYGEN SATURATION: 96 % | WEIGHT: 199.38 LBS | BODY MASS INDEX: 28.54 KG/M2 | HEART RATE: 88 BPM | RESPIRATION RATE: 20 BRPM | HEIGHT: 70 IN | SYSTOLIC BLOOD PRESSURE: 119 MMHG

## 2025-05-05 DIAGNOSIS — G45.9 TIA (TRANSIENT ISCHEMIC ATTACK): ICD-10-CM

## 2025-05-05 DIAGNOSIS — G45.9 TIA (TRANSIENT ISCHEMIC ATTACK): Primary | ICD-10-CM

## 2025-05-05 DIAGNOSIS — E05.90 HYPERTHYROIDISM: ICD-10-CM

## 2025-05-05 PROCEDURE — 93246 EXT ECG>7D<15D RECORDING: CPT

## 2025-05-05 PROCEDURE — 99214 OFFICE O/P EST MOD 30 MIN: CPT | Performed by: NURSE PRACTITIONER

## 2025-05-05 NOTE — PROGRESS NOTES
Chief Complaint  Stroke and Establish Care    Subjective      History of Present Illness {CC  Problem List  Visit  Diagnosis   Encounters  Notes  Medications  Labs  Result Review Imaging  Media :23}     Kevin Strong Thurman, 59 y.o. male with history of TIA, CVA,, T2DM, hypothyroidism presents to [facility] Fibr[facility]/arrhythmia clinic for Stroke and Establish Care.    Patient recently evaluated at emergency department 1/2/2025 for evaluation of paresthesias in his left arm that extended into his left face.  No focal weakness.  He reported prior TIA 4 years ago when he lived in [place].  Does not take any medications, no anticoagulants.  No fever or infectious symptoms.  He felt well prior to the onset of the symptoms.  It was felt on exam that he did have subtle left lower facial droop and left arm drift.  CT scan of the head without contrast, CT perfusion, CT angio of the head and neck, MRI of the brain, and chest x-ray showed no acute abnormalities. CT angiogram the neck does report mild plaque causing less than 50% stenosis of bilateral internal carotid arteries.  Asymmetric enlargement of the cord plexus in the right occipital horn on CT scan of the head papilloma is not excluded. No significant intracranial arterial stenosis. Dr. Lit Valle was consulted and felt that he had TIA vs. Complex migraine and it was recommended that he undergo cardiac echo, hemoglobin A1c, to start atorvastatin 80 mg daily, also to take aspirin 81 mg daily with clopidogrel load of 300 mg +75 mg for maintenance.  He was to follow-up in the stroke clinic.   Last Echo: 2/2025--EF-58%, Left ventricular diastolic function is consistent with (grade I) impaired relaxation. No significant valvular abnormalities noted. Prior exercise stress test on 2/14/2025 which showed a normal exercise treadmill stress test.  Low risk Duke treadmill score.  Recommendation for 14-day Holter monitor at recent neurology  "appointment.    Presents today reporting intermittent tachypalpitation episodes. Symptoms a couple of times per week, lasting approximately 1 hours. Denies near syncope/syncope. Working with PCP on abnormal thyroid studies.       Objective     Vital Signs:   Vitals:    05/05/25 1445 05/05/25 1448   BP: 130/77 119/74   BP Location: Left arm Left arm   Patient Position: Standing Sitting   Cuff Size: Adult Adult   Pulse: 91 88   Resp:  20   SpO2: 97% 96%   Weight:  90.4 kg (199 lb 6 oz)   Height:  177.8 cm (70\")     Body mass index is 28.61 kg/m².  Physical Exam  Vitals and nursing note reviewed.   Constitutional:       Appearance: Normal appearance.   HENT:      Head: Normocephalic.   Eyes:      Extraocular Movements: Extraocular movements intact.   Neck:      Vascular: No carotid bruit.   Cardiovascular:      Rate and Rhythm: Normal rate and regular rhythm.      Pulses: Normal pulses.      Heart sounds: Normal heart sounds, S1 normal and S2 normal. No murmur heard.  Pulmonary:      Effort: Pulmonary effort is normal. No respiratory distress.      Breath sounds: Normal breath sounds.   Musculoskeletal:      Cervical back: Neck supple.      Right lower leg: No edema.      Left lower leg: No edema.   Skin:     General: Skin is warm and dry.   Neurological:      General: No focal deficit present.      Mental Status: He is alert.   Psychiatric:         Mood and Affect: Mood normal.         Behavior: Behavior normal.         Thought Content: Thought content normal.        Data Reviewed:{ Labs  Result Review  Imaging  Med Tab  Media :23}     Treadmill Stress Test (02/14/2025 10:12)  Adult Transthoracic Echo Complete W/ Cont if Necessary Per Protocol (01/31/2025 16:46)  ECG 12 Lead ED Triage Standing Order; Acute Stroke (Onset <24 hrs) (01/02/2025 14:33)  CT Angiogram Neck (01/02/2025 14:06)   T3, free (01/31/2025 14:03)  Lipid panel (01/23/2025 08:43)  Comprehensive metabolic panel (01/23/2025 08:43)  CBC w AUTO " Differential (01/23/2025 08:43)  T4F (01/23/2025 08:43)  TSH Rfx On Abnormal To Free T4 (01/23/2025 08:43)      Assessment & Plan   Assessment and Plan {CC Problem List  Visit Diagnosis  ROS  Review (Popup)  [employer]  Quality  BestPractice  Medications  SmartSets  SnapShot Encounters  Media :23}     1. TIA (transient ischemic attack)  -Recent TIA vs. Complex migraine    -CT scan of the head without contrast, CT perfusion, CT angio of the head and neck, MRI of the brain, and chest x-ray showed no acute abnormalities. -CT angiogram the neck does report mild plaque causing less than 50% stenosis of bilateral internal carotid arteries. Asymmetric enlargement of the cord plexus in the right occipital horn on CT scan of the head papilloma is not excluded. No significant intracranial arterial stenosis.   -Last Echo: 2/2025--EF-58%, Left ventricular diastolic function is consistent with (grade I) impaired relaxation. No significant valvular abnormalities noted.  -Continue ASA, atorvastatin as prescribed  -Recommendation for 14-day Holter monitor at recent neurology appointment.  - Holter Monitor - 14 Days; Future  -Will message primary cardiology for follow-up to review monitor results    2. Hyperthyroidism  -Recent low TSH January 2025  -Discussed continue workup as recommended PCP      Follow Up {Instructions Charge Capture  Follow-up Communications :23}     Return if symptoms worsen or fail to improve.      Patient was given instructions and counseling regarding his condition or for health maintenance advice. Please see specific information pulled into the AVS if appropriate. Patient was instructed to call the [facility] with any questions, concerns, or worsening symptoms.    Dictated Utilizing Dragon Dictation   Please note that portions of this note were completed with a voice recognition program. Part of this note may be an electronic transcription/translation of spoken language to  printed text using the Dragon Dictation System.

## 2025-05-06 ENCOUNTER — TELEPHONE (OUTPATIENT)
Dept: CARDIOLOGY | Facility: HOSPITAL | Age: 60
End: 2025-05-06
Payer: COMMERCIAL

## 2025-05-06 NOTE — TELEPHONE ENCOUNTER
Dr. Knott did not have any availability but patient has been scheduled with Dr. Seht on 6/12 at 10:00.     Attempted to call patient to discuss, no voicemail setup.     Left message on patient's wife's voicemail.

## 2025-05-06 NOTE — TELEPHONE ENCOUNTER
----- Message from Jah Duke sent at 5/5/2025  3:11 PM EDT -----  Regarding: [facility] primary cardiology follow-up  Could you reach out to primary cardiology (Dr. Knott's office) and ask them to schedule 1 month follow-up for monitor results.  Thank you

## 2025-05-20 NOTE — PRE-PROCEDURE INSTRUCTIONS
PAT phone history completed with patient for upcoming procedure on 5/30/25 with Dr. Saeed.    PAT PASS reviewed with patient and they verbalize understanding of the following:     Do not eat or drink anything after midnight the night before procedure unless otherwise instructed by physician/surgeon's office, this includes no gum, candy, mints, tobacco products or e-cigarettes.  Do not shave the area to be operated on at least 48 hours prior to procedure.  Do not wear makeup, lotion, hair products, or nail polish.  Do not wear any jewelry and remove all piercings.  Do not wear any adhesive if you wear dentures.  Do not wear contacts; bring in glasses if needed.  Only take medications on the morning of procedure as instructed by PAT nurse per anesthesia guidelines or as instructed by physician's office.  If you are on any blood thinners reach out to the physician/surgeon's office for instructions on when/if they will need to be stopped prior to procedure.  Bring in picture ID and insurance card, advanced directive copies if applicable, CPAP/BIPAP/Inhalers if indicated morning of procedure, leave any other valuables at home.  Ensure you have arranged for someone to drive you home the day of your procedure and someone to care for you at home afterwards. It is recommended that you do not drive, drink alcohol, or make any major legal decisions for at least 24 hours after your procedure is complete.  ERAS instructions given unless otherwise instructed per surgeon's orders.    Instructions given on hospital entrance and registration location.

## 2025-05-27 ENCOUNTER — TELEPHONE (OUTPATIENT)
Dept: FAMILY MEDICINE CLINIC | Facility: CLINIC | Age: 60
End: 2025-05-27

## 2025-05-27 DIAGNOSIS — H91.91 HEARING LOSS OF RIGHT EAR, UNSPECIFIED HEARING LOSS TYPE: Primary | ICD-10-CM

## 2025-05-27 NOTE — TELEPHONE ENCOUNTER
Caller: Kevin Thurman    Relationship: Self    Best call back number: [phone]     What is the medical concern/diagnosis: PATIENT NOW HAVING HEARING LOSS, NOT GETTING ANY BETTER EVEN AFTER CLEANING OUT THE WAX    What specialty or service is being requested: EAR DOCTOR, OR AUDITORY SPECIALIST

## 2025-05-29 ENCOUNTER — TELEPHONE (OUTPATIENT)
Dept: SURGERY | Facility: CLINIC | Age: 60
End: 2025-05-29
Payer: COMMERCIAL

## 2025-05-29 NOTE — TELEPHONE ENCOUNTER
Patient LVM on MA line regarding prep for his colonoscopy with Dr. Saeed tomorrow. Returned patient's call, he had not received his prep instructions in the mail. I sent them to him via [employer]. Patient confirmed he received them. He knows to call back if he has any other questions.

## 2025-05-30 ENCOUNTER — ANESTHESIA EVENT (OUTPATIENT)
Dept: GASTROENTEROLOGY | Facility: HOSPITAL | Age: 60
End: 2025-05-30
Payer: COMMERCIAL

## 2025-05-30 ENCOUNTER — HOSPITAL ENCOUNTER (OUTPATIENT)
Facility: HOSPITAL | Age: 60
Setting detail: HOSPITAL OUTPATIENT SURGERY
Discharge: HOME | End: 2025-05-30
Attending: SURGERY | Admitting: SURGERY
Payer: COMMERCIAL

## 2025-05-30 ENCOUNTER — ANESTHESIA (OUTPATIENT)
Dept: GASTROENTEROLOGY | Facility: HOSPITAL | Age: 60
End: 2025-05-30
Payer: COMMERCIAL

## 2025-05-30 VITALS
HEART RATE: 80 BPM | DIASTOLIC BLOOD PRESSURE: 77 MMHG | HEIGHT: 70 IN | SYSTOLIC BLOOD PRESSURE: 125 MMHG | TEMPERATURE: 99.2 F | OXYGEN SATURATION: 94 % | WEIGHT: 199 LBS | RESPIRATION RATE: 16 BRPM | BODY MASS INDEX: 28.49 KG/M2

## 2025-05-30 PROCEDURE — 25810000003 LACTATED RINGERS PER 1000 ML: Performed by: NURSE ANESTHETIST, CERTIFIED REGISTERED

## 2025-05-30 PROCEDURE — 25010000002 FENTANYL CITRATE (PF) 50 MCG/ML SOLUTION: Performed by: NURSE ANESTHETIST, CERTIFIED REGISTERED

## 2025-05-30 PROCEDURE — 25010000002 MIDAZOLAM PER 1MG: Performed by: NURSE ANESTHETIST, CERTIFIED REGISTERED

## 2025-05-30 PROCEDURE — 45378 DIAGNOSTIC COLONOSCOPY: CPT | Performed by: SURGERY

## 2025-05-30 PROCEDURE — 25010000002 PROPOFOL 10 MG/ML EMULSION: Performed by: NURSE ANESTHETIST, CERTIFIED REGISTERED

## 2025-05-30 PROCEDURE — S0260 H&P FOR SURGERY: HCPCS | Performed by: SURGERY

## 2025-05-30 RX ORDER — MIDAZOLAM HYDROCHLORIDE 2 MG/2ML
INJECTION, SOLUTION INTRAMUSCULAR; INTRAVENOUS AS NEEDED
Status: DISCONTINUED | OUTPATIENT
Start: 2025-05-30 | End: 2025-05-30 | Stop reason: SURG

## 2025-05-30 RX ORDER — SODIUM CHLORIDE, SODIUM LACTATE, POTASSIUM CHLORIDE, CALCIUM CHLORIDE 600; 310; 30; 20 MG/100ML; MG/100ML; MG/100ML; MG/100ML
INJECTION, SOLUTION INTRAVENOUS CONTINUOUS PRN
Status: DISCONTINUED | OUTPATIENT
Start: 2025-05-30 | End: 2025-05-30 | Stop reason: SURG

## 2025-05-30 RX ORDER — FENTANYL CITRATE 50 UG/ML
INJECTION, SOLUTION INTRAMUSCULAR; INTRAVENOUS AS NEEDED
Status: DISCONTINUED | OUTPATIENT
Start: 2025-05-30 | End: 2025-05-30 | Stop reason: SURG

## 2025-05-30 RX ORDER — ONDANSETRON 2 MG/ML
4 INJECTION INTRAMUSCULAR; INTRAVENOUS ONCE AS NEEDED
Status: DISCONTINUED | OUTPATIENT
Start: 2025-05-30 | End: 2025-05-30 | Stop reason: HOSPADM

## 2025-05-30 RX ORDER — KETAMINE HCL IN NACL, ISO-OSM 100MG/10ML
SYRINGE (ML) INJECTION AS NEEDED
Status: DISCONTINUED | OUTPATIENT
Start: 2025-05-30 | End: 2025-05-30 | Stop reason: SURG

## 2025-05-30 RX ORDER — SIMETHICONE 40MG/0.6ML
SUSPENSION, DROPS(FINAL DOSAGE FORM)(ML) ORAL AS NEEDED
Status: DISCONTINUED | OUTPATIENT
Start: 2025-05-30 | End: 2025-05-30 | Stop reason: HOSPADM

## 2025-05-30 RX ORDER — PROPOFOL 10 MG/ML
VIAL (ML) INTRAVENOUS AS NEEDED
Status: DISCONTINUED | OUTPATIENT
Start: 2025-05-30 | End: 2025-05-30 | Stop reason: SURG

## 2025-05-30 RX ADMIN — MIDAZOLAM HYDROCHLORIDE 2 MG: 1 INJECTION, SOLUTION INTRAMUSCULAR; INTRAVENOUS at 13:15

## 2025-05-30 RX ADMIN — PROPOFOL 30 MG: 10 INJECTION, EMULSION INTRAVENOUS at 13:30

## 2025-05-30 RX ADMIN — FENTANYL CITRATE 50 MCG: 50 INJECTION, SOLUTION INTRAMUSCULAR; INTRAVENOUS at 13:15

## 2025-05-30 RX ADMIN — PROPOFOL 30 MG: 10 INJECTION, EMULSION INTRAVENOUS at 13:24

## 2025-05-30 RX ADMIN — PROPOFOL 30 MG: 10 INJECTION, EMULSION INTRAVENOUS at 13:18

## 2025-05-30 RX ADMIN — SODIUM CHLORIDE, POTASSIUM CHLORIDE, SODIUM LACTATE AND CALCIUM CHLORIDE: 600; 310; 30; 20 INJECTION, SOLUTION INTRAVENOUS at 12:53

## 2025-05-30 RX ADMIN — PROPOFOL 20 MG: 10 INJECTION, EMULSION INTRAVENOUS at 13:20

## 2025-05-30 RX ADMIN — SODIUM CHLORIDE, POTASSIUM CHLORIDE, SODIUM LACTATE AND CALCIUM CHLORIDE: 600; 310; 30; 20 INJECTION, SOLUTION INTRAVENOUS at 12:21

## 2025-05-30 RX ADMIN — Medication 25 MG: at 13:16

## 2025-05-30 NOTE — H&P
[facility]   HISTORY AND PHYSICAL      Name:  Kevin Thurman   Age:  59 y.o.  Sex:  male  :  1965  MRN:  [id number]   Visit Number:  [id number]  Admission Date:  2025  Date Of Service:  25  Primary Care Physician:  Tong Sorenson MD    Chief Complaint:     I need a colonoscopy    History Of Presenting Illness:      Here for his first screening colonoscopy.  No GI complaints.  No family history of colon cancer.    Review Of Systems:     General ROS: Patient denies any fevers, chills or loss of consciousness.  No complaints of generalized weakness  Psychological ROS: Denies any hallucinations and delusions.  Respiratory ROS: Denies cough or shortness of breath.   Cardiovascular ROS: Denies chest pain or palpitations. No history of exertional chest pain.   Gastrointestinal ROS: Denies nausea and vomiting. Denies any abdominal pain. No diarrhea.   Genito-Urinary ROS: Denies dysuria or hematuria.  Neurological ROS: Denies any focal weakness. No loss of consciousness. Denies any numbness.   Dermatological ROS: Denies any redness or pruritis.     Past Medical History:    Past Medical History:   Diagnosis Date    Diabetes mellitus     Elevated cholesterol     H/O echocardiogram 2025    History of exercise stress test 2025    Mild hearing loss     Racing heart beat     TIA (transient ischemic attack) 2025       Past Surgical history:    Past Surgical History:   Procedure Laterality Date    HERNIA REPAIR      TYMPANOPLASTY         Social History:    Social History     Socioeconomic History    Marital status:    Tobacco Use    Smoking status: Former     Current packs/day: 0.00     Average packs/day: 0.5 packs/day for 3.0 years (1.5 ttl pk-yrs)     Types: Cigarettes     Start date: 1985     Quit date: 1988     Years since quittin.4     Passive exposure: Never    Smokeless tobacco: Never   Vaping Use    Vaping status: Never Used    Substance and Sexual Activity    Alcohol use: Never    Drug use: Never    Sexual activity: Yes     Partners: Female     Birth control/protection: Abstinence, Coitus interruptus       Family History:    Family History   Problem Relation Age of Onset    Heart disease Father         Naot adding names    Cancer Sister         My sister name not needed    Prostate cancer Neg Hx     Kidney cancer Neg Hx         no bladder cancer    Testicular cancer Neg Hx        Allergies:      Patient has no known allergies.    Home Medications:    Prior to Admission Medications       Prescriptions Last Dose Informant Patient Reported? Taking?    aspirin 81 MG EC tablet 5/23/2025 Self No Yes    Take 1 tablet by mouth Daily.    atorvastatin (Lipitor) 20 MG tablet Not Taking Self No No    Take 1 tablet by mouth Daily.    Patient not taking:  Reported on 5/20/2025    bisacodyl (Dulcolax) 5 MG EC tablet 5/29/2025 Self No Yes    4 TABLETS TO BE TAKING AT TIME DIRECTED ON INSTRUCTED THE DAY PRIOR TO COLONOSCOPY.    NON FORMULARY Past Month Self Yes Yes    Take 1 tablet by mouth Daily. Relief factor    polyethylene glycol (MiraLax) 17 GM/SCOOP powder 5/29/2025 Self No Yes     GRAM POWDER WITH 64 OZ OF CLEAR LIQUID. USE AS DIRECTED               ED Medications:    Medications - No data to display    Vital Signs:    Temp:  [98 °F (36.7 °C)] 98 °F (36.7 °C)  Heart Rate:  [85] 85  Resp:  [16] 16  BP: (132)/(81) 132/81        05/20/25  1524   Weight: 90.3 kg (199 lb)       Body mass index is 28.55 kg/m².    Physical Exam:      General Appearance:  Alert and cooperative, not in any acute distress.   Head:  Atraumatic and normocephalic, without obvious abnormality.   Eyes:          PERRLA, conjunctivae and sclerae normal, no Icterus. No pallor. Extra-occular movements are within normal limits.   Ears:  Ears appear intact with no abnormalities noted.   Respiratory/Lungs:   Breath sounds heard bilaterally equally.  No crackles or wheezing. No  Pleural rub or bronchial breathing. Normal respiratory effort.    Cardiovascular/Heart:  Normal S1 and S2,    GI/Abdomen:   No masses, no hepatosplenomegaly. Soft, non-tender, non-distended, no hernia                 Musculoskeletal/ Extremities:   Moves all extremities well   Skin: No bleeding, bruising or rash, no induration   Psychiatric : Alert and oriented ×3.  No depression or anxiety    Neurologic: Cranial nerves 2 - 12 grossly intact, sensation intact, Motor power is normal and equal bilaterally.       EKG:          Labs:    Lab Results (last 24 hours)       ** No results found for the last 24 hours. **            Radiology:    Imaging Results (Last 72 Hours)       ** No results found for the last 72 hours. **            Assessment:    Screening colonoscopy     Plan:     I recommend a screening colonoscopy to the patient.  The patient understands the procedure and the reason for the procedure.  The patient also understands the risks which include but are not limited to bleeding and perforation and they understand the ramifications of these potential complications including operative intervention and wish to proceed.    Scott Saeed MD  05/30/25  13:13 EDT

## 2025-05-30 NOTE — ANESTHESIA POSTPROCEDURE EVALUATION
Patient: Kevin Strong Thurman    Procedure Summary       Date: 05/30/25 Room / Location: [facility] ENDOSCOPY 1 / [facility] ENDOSCOPY    Anesthesia Start: 1312 Anesthesia Stop:     Procedure: COLONOSCOPY (Anus) Diagnosis:       Screening for colon cancer      (Screening for colon cancer [Z12.11])    Surgeons: Scott Saeed MD Provider: Duy Dumont CRNA    Anesthesia Type: MAC ASA Status: 3            Anesthesia Type: MAC    Vitals  No vitals data found for the desired time range.          Post Anesthesia Care and Evaluation    Patient location during evaluation: PHASE II  Patient participation: complete - patient participated  Level of consciousness: awake  Pain score: 0  Pain management: adequate    Airway patency: patent  Anesthetic complications: No anesthetic complications  PONV Status: none  Cardiovascular status: acceptable  Respiratory status: acceptable, nasal cannula and spontaneous ventilation  Hydration status: acceptable    Comments: vsss resp spont, reflexes intact, responsive, report given to pacu nurseSee R.N. note for postop vital signs.

## 2025-05-30 NOTE — ANESTHESIA PREPROCEDURE EVALUATION
Anesthesia Evaluation     Patient summary reviewed and Nursing notes reviewed   no history of anesthetic complications:   NPO Solid Status: > 8 hours  NPO Liquid Status: > 4 hours           Airway   Mallampati: II  TM distance: >3 FB  Neck ROM: full  Possible difficult intubation  Dental      Pulmonary    (+) a smoker Former,sleep apnea  (-) decreased breath sounds, wheezes  Cardiovascular     PT is on anticoagulation therapy    (+) dysrhythmias PVC, PVD, hyperlipidemia      Neuro/Psych  (+) TIA, psychiatric history  GI/Hepatic/Renal/Endo    (+) diabetes mellitus type 2, thyroid problem hypothyroidism and hyperthyroidism    Musculoskeletal     Abdominal    Substance History      OB/GYN          Other        ROS/Med Hx Other: EKG sr pvc's               Anesthesia Plan    ASA 3     MAC     (Risks and benefits discussed including risk of aspiration, recall and dental damage. All patient questions answered.    Will continue with plan of care.)  intravenous induction     Anesthetic plan, risks, benefits, and alternatives have been provided, discussed and informed consent has been obtained with: patient.  Pre-procedure education provided    CODE STATUS:

## 2025-06-12 ENCOUNTER — TELEPHONE (OUTPATIENT)
Dept: CARDIOLOGY | Facility: CLINIC | Age: 60
End: 2025-06-12
Payer: COMMERCIAL

## 2025-06-12 NOTE — TELEPHONE ENCOUNTER
"Relay     \"CALLED PATIENT TO ADVISED HIS HOLTER MONITOR IS NOT SHOWING RECEIVED WITH [email or web address]. IF IT HAS NOT BEEN MAILED HE WILL NEED TO COMPLETE THIS ASAP. PATIENT WILL NEED TO RESCHEDULE HIS APPOINTMENT FROM 6/16/25 UNTIL THIS IS RECEIVED AND REVIEWED BY AN MD IN THE OFFICE. I CONTACTED THE OFFICE THAT PLACED THE MONITOR AND THEY ADVISED THEY HAVE NOT YET RECEIVED A REPORT. PLEASE RESCHEDULE PATIENT X 2-3 WEEKS OUT TO GIVE TIME TO OBTAIN MONITOR RESULTS. \"  "

## 2025-07-02 ENCOUNTER — TELEPHONE (OUTPATIENT)
Dept: CARDIOLOGY | Facility: HOSPITAL | Age: 60
End: 2025-07-02
Payer: COMMERCIAL

## 2025-07-02 ENCOUNTER — TELEPHONE (OUTPATIENT)
Dept: NEUROLOGY | Facility: CLINIC | Age: 60
End: 2025-07-02
Payer: COMMERCIAL

## 2025-07-02 DIAGNOSIS — I48.0 PAROXYSMAL ATRIAL FIBRILLATION: Primary | ICD-10-CM

## 2025-07-02 RX ORDER — METOPROLOL TARTRATE 25 MG/1
25 TABLET, FILM COATED ORAL EVERY 12 HOURS
Qty: 60 TABLET | Refills: 1 | Status: SHIPPED | OUTPATIENT
Start: 2025-07-02

## 2025-07-02 NOTE — TELEPHONE ENCOUNTER
Spoke to patient about informed him that cardiac monitor showed atrial fibrillation reported on preliminary data.  Approximately 3% atrial fibrillation cardiac monitor.     Advised patient that given his recent suspected TIA, Frederick has recommend initiation of anticoagulation and cardiac medication for rate/rhythm control.  He will begin apixaban 5 Mg every 12 hours for anticoagulation/stroke prevention.  Continue asprin 81mg daily for suspected TIA. Encouraged patient to monitor for signs and symptoms of bleeding with anticoagulation including blood in urine or stool.  He will also begin metoprolol tartrate 25 Mg every 12 hours to help calm heart rhythm/rate.

## 2025-07-02 NOTE — TELEPHONE ENCOUNTER
Preliminary cardiac monitor data with atrial fibrillation burden approximately 3%, longest duration episode approximately 7 hours.  Average heart rate on cardiac monitor 90 bpm.    Attempted contact patient to discuss cardiac monitor result and recommendation to initiate anticoagulation with apixaban and metoprolol tartrate for rate/rhythm control, unable to talk to patient or leave voicemail.

## 2025-07-02 NOTE — TELEPHONE ENCOUNTER
2nd att   Telemetry Shift Summary     Rhythm SR/ST  HR Range   Ectopy r PAC/ PVC  Measurements  0.14/0.08/0.34  Per strip printed 0400     Normal Values  Rhythm SR  HR Range    Measurements 0.12-0.20 / 0.06-0.10  / 0.30-0.52

## 2025-07-03 LAB
CV ZIO AF AVG BPM: 118 BPM
CV ZIO AF BPM HIGH: 215 BPM
CV ZIO AF BPM LOW: 75 BPM
CV ZIO AF F EPI AVG BPM: 182 BPM
CV ZIO AF F EPI BPM HIGH: 215 BPM
CV ZIO AF F EPI BPM LOW: 143 BPM
CV ZIO AF F EPI DT: NORMAL
CV ZIO AF L EPI AVG BPM: 118 BPM
CV ZIO AF L EPI BPM HIGH: 194 BPM
CV ZIO AF L EPI BPM LOW: 75 BPM
CV ZIO AF L EPI DUR: NORMAL SEC
CV ZIO AF L EPI END: NORMAL
CV ZIO AF L EPI START: NORMAL
CV ZIO AF PERCENT: 3 %
CV ZIO AF S EPI AVG BPM: 118 BPM
CV ZIO AF S EPI BPM HIGH: 194 BPM
CV ZIO AF S EPI BPM LOW: 75 BPM
CV ZIO AF S EPI DT: NORMAL
CV ZIO AF SYMPT IN PT: NORMAL
CV ZIO BASELINE AVG BPM: 90 BPM
CV ZIO BASELINE BPM HIGH: 231 BPM
CV ZIO BASELINE BPM LOW: 72 BPM
CV ZIO DEVICE ANALYSIS TIME: NORMAL
CV ZIO ECT SVE COUNT: 5273 EPISODES
CV ZIO ECT SVE CPLT COUNT: 193 EPISODES
CV ZIO ECT SVE CPLT FREQ: NORMAL
CV ZIO ECT SVE FREQ: NORMAL
CV ZIO ECT SVE TPLT COUNT: 41 EPISODES
CV ZIO ECT SVE TPLT FREQ: NORMAL
CV ZIO ECT VE COUNT: 755 EPISODES
CV ZIO ECT VE CPLT COUNT: 26 EPISODES
CV ZIO ECT VE CPLT FREQ: NORMAL
CV ZIO ECT VE FREQ: NORMAL
CV ZIO ECT VE TPLT COUNT: 7 EPISODES
CV ZIO ECT VE TPLT FREQ: NORMAL
CV ZIO ECTOPIC SVE COUPLET RAW PERCENT: 0.03 %
CV ZIO ECTOPIC SVE ISOLATED PERCENT: 0.45 %
CV ZIO ECTOPIC SVE TRIPLET RAW PERCENT: 0.01 %
CV ZIO ECTOPIC VE COUPLET RAW PERCENT: 0 %
CV ZIO ECTOPIC VE ISOLATED PERCENT: 0.06 %
CV ZIO ECTOPIC VE TRIPLET RAW PERCENT: 0 %
CV ZIO ENROLLMENT END: NORMAL
CV ZIO ENROLLMENT START: NORMAL
CV ZIO IRREG TYPE: NORMAL
CV ZIO PATIENT EVENTS DIARIES: 1
CV ZIO PATIENT EVENTS TRIGGERS: 55
CV ZIO PAUSE COUNT: 0
CV ZIO PRESCRIPTION STATUS: NORMAL
CV ZIO SVT AVG BPM: 149 BPM
CV ZIO SVT BPM HIGH: 231 BPM
CV ZIO SVT BPM LOW: 100 BPM
CV ZIO SVT COUNT: 29
CV ZIO SVT F EPI AVG BPM: 201 BPM
CV ZIO SVT F EPI BEATS: 6 BEATS
CV ZIO SVT F EPI BPM HIGH: 231 BPM
CV ZIO SVT F EPI BPM LOW: 171 BPM
CV ZIO SVT F EPI DUR: 2 SEC
CV ZIO SVT F EPI END: NORMAL
CV ZIO SVT F EPI START: NORMAL
CV ZIO SVT L EPI AVG BPM: 148 BPM
CV ZIO SVT L EPI BEATS: 8 BEATS
CV ZIO SVT L EPI BPM HIGH: 164 BPM
CV ZIO SVT L EPI BPM LOW: 128 BPM
CV ZIO SVT L EPI DUR: 3.2 SEC
CV ZIO SVT L EPI END: NORMAL
CV ZIO SVT L EPI START: NORMAL
CV ZIO SVT SYMPT IN PT: NORMAL
CV ZIO TOTAL  ENROLLMENT PERIOD: NORMAL
CV ZIO VT AVG BPM: 148 BPM
CV ZIO VT BPM HIGH: 148 BPM
CV ZIO VT BPM LOW: 148 BPM
CV ZIO VT COUNT: 1
CV ZIO VT F EPI AVG BPM: 148
CV ZIO VT F EPI BEATS: 4 BEATS
CV ZIO VT F EPI BPM HIGH: 148
CV ZIO VT F EPI BPM LOW: 148
CV ZIO VT F EPI DUR: 1.8 SEC
CV ZIO VT F EPI END: NORMAL
CV ZIO VT F EPI START: NORMAL
CV ZIO VT L EPI AVG BPM: 148
CV ZIO VT L EPI BEATS: 4 BEATS
CV ZIO VT L EPI BPM HIGH: 148 BPM
CV ZIO VT L EPI BPM LOW: 148 BPM
CV ZIO VT L EPI DUR: 1.8
CV ZIO VT L EPI END: NORMAL
CV ZIO VT L EPI START: NORMAL

## (undated) DEVICE — PATIENT RETURN ELECTRODE, SINGLE-USE, CONTACT QUALITY MONITORING, ADULT, WITH 9FT CORD, FOR PATIENTS WEIGING OVER 33LBS. (15KG): Brand: MEGADYNE

## (undated) DEVICE — HYBRID CO2 TUBING/CAP SET FOR OLYMPUS® SCOPES & CO2 SOURCE: Brand: ERBE

## (undated) DEVICE — VLV SXN AIR/H2O ORCAPOD3 1P/U STRL

## (undated) DEVICE — Device

## (undated) DEVICE — ENDOSCOPY PORT CONNECTOR FOR OLYMPUS® SCOPES: Brand: ERBE